# Patient Record
Sex: FEMALE | Race: WHITE | NOT HISPANIC OR LATINO | Employment: FULL TIME | URBAN - NONMETROPOLITAN AREA
[De-identification: names, ages, dates, MRNs, and addresses within clinical notes are randomized per-mention and may not be internally consistent; named-entity substitution may affect disease eponyms.]

---

## 2017-03-14 ENCOUNTER — OFFICE VISIT (OUTPATIENT)
Dept: OBSTETRICS AND GYNECOLOGY | Facility: CLINIC | Age: 57
End: 2017-03-14

## 2017-03-14 VITALS
BODY MASS INDEX: 31.66 KG/M2 | WEIGHT: 197 LBS | SYSTOLIC BLOOD PRESSURE: 119 MMHG | DIASTOLIC BLOOD PRESSURE: 73 MMHG | HEIGHT: 66 IN

## 2017-03-14 DIAGNOSIS — I10 ESSENTIAL HYPERTENSION: Primary | ICD-10-CM

## 2017-03-14 DIAGNOSIS — G47.01 INSOMNIA DUE TO MEDICAL CONDITION: ICD-10-CM

## 2017-03-14 DIAGNOSIS — N95.1 MENOPAUSAL SYMPTOMS: ICD-10-CM

## 2017-03-14 DIAGNOSIS — R53.83 LETHARGY: ICD-10-CM

## 2017-03-14 PROCEDURE — 99202 OFFICE O/P NEW SF 15 MIN: CPT | Performed by: OBSTETRICS & GYNECOLOGY

## 2017-03-14 RX ORDER — LISINOPRIL AND HYDROCHLOROTHIAZIDE 25; 20 MG/1; MG/1
1 TABLET ORAL DAILY
COMMUNITY
End: 2017-03-14

## 2017-03-14 RX ORDER — ASPIRIN 81 MG/1
81 TABLET ORAL DAILY
COMMUNITY

## 2017-03-14 RX ORDER — FEXOFENADINE HCL 180 MG/1
180 TABLET ORAL DAILY
Qty: 90 TABLET | Refills: 3 | Status: SHIPPED | OUTPATIENT
Start: 2017-03-14

## 2017-03-14 RX ORDER — LISINOPRIL 40 MG/1
40 TABLET ORAL DAILY
Qty: 90 TABLET | Refills: 3 | Status: SHIPPED | OUTPATIENT
Start: 2017-03-14 | End: 2018-05-07 | Stop reason: SDUPTHER

## 2017-03-14 RX ORDER — LISINOPRIL 40 MG/1
40 TABLET ORAL DAILY
Qty: 90 TABLET | Refills: 3 | Status: SHIPPED | OUTPATIENT
Start: 2017-03-14 | End: 2017-03-14 | Stop reason: SDUPTHER

## 2017-03-14 RX ORDER — FEXOFENADINE HCL 180 MG/1
180 TABLET ORAL DAILY
COMMUNITY
End: 2017-03-14 | Stop reason: SDUPTHER

## 2017-03-14 RX ORDER — LISINOPRIL 10 MG/1
10 TABLET ORAL DAILY
COMMUNITY
End: 2017-03-14

## 2017-03-14 RX ORDER — PSEUDOEPHEDRINE HCL 30 MG
30 TABLET ORAL EVERY 4 HOURS PRN
COMMUNITY

## 2017-03-14 RX ORDER — MELOXICAM 15 MG/1
15 TABLET ORAL DAILY
COMMUNITY
End: 2017-07-27 | Stop reason: SDUPTHER

## 2017-03-14 RX ORDER — HYDROCHLOROTHIAZIDE 25 MG/1
25 TABLET ORAL DAILY
Qty: 90 TABLET | Refills: 3 | Status: SHIPPED | OUTPATIENT
Start: 2017-03-14 | End: 2017-08-28 | Stop reason: SDUPTHER

## 2017-03-14 RX ORDER — ESZOPICLONE 3 MG/1
3 TABLET, FILM COATED ORAL NIGHTLY
Qty: 30 TABLET | Refills: 5 | Status: SHIPPED | OUTPATIENT
Start: 2017-03-14 | End: 2017-08-28 | Stop reason: SDUPTHER

## 2017-03-14 NOTE — PROGRESS NOTES
Subjective   Saniya Lundberg is a 56 y.o. female with lethargy and menopausal symptoms and hypertension.    History of Present Illness  She has had some hypotension on her current BP med regimen.  Also insomnia.  Postmenapausal.  Needs labs    The following portions of the patient's history were reviewed and updated as appropriate: allergies, current medications, past family history, past medical history, past social history, past surgical history and problem list.    Review of Systems   Constitutional: Positive for fatigue. Negative for activity change, appetite change, chills, diaphoresis, fever and unexpected weight change.   HENT: Negative for congestion, dental problem, drooling, ear discharge, ear pain, facial swelling, hearing loss, mouth sores, nosebleeds, postnasal drip, rhinorrhea, sinus pressure, sneezing, sore throat, tinnitus, trouble swallowing and voice change.    Eyes: Negative for photophobia, pain, discharge, redness, itching and visual disturbance.   Respiratory: Negative for apnea, cough, choking, chest tightness, shortness of breath, wheezing and stridor.    Cardiovascular: Negative for chest pain, palpitations and leg swelling.   Gastrointestinal: Negative for abdominal distention, abdominal pain, anal bleeding, blood in stool, constipation, diarrhea, nausea, rectal pain and vomiting.   Endocrine: Negative for cold intolerance, heat intolerance, polydipsia, polyphagia and polyuria.   Genitourinary: Negative for decreased urine volume, difficulty urinating, dysuria, enuresis, flank pain, frequency, genital sores, hematuria and urgency.   Musculoskeletal: Negative for arthralgias, back pain, gait problem, joint swelling, myalgias, neck pain and neck stiffness.   Skin: Negative for color change, pallor, rash and wound.   Allergic/Immunologic: Negative for environmental allergies, food allergies and immunocompromised state.   Neurological: Negative for dizziness, tremors, seizures, syncope,  facial asymmetry, speech difficulty, weakness, light-headedness, numbness and headaches.   Hematological: Negative for adenopathy. Does not bruise/bleed easily.   Psychiatric/Behavioral: Positive for sleep disturbance. Negative for agitation, behavioral problems, confusion, decreased concentration, dysphoric mood, hallucinations, self-injury and suicidal ideas. The patient is not nervous/anxious and is not hyperactive.        Objective   Physical Exam   Constitutional: She is oriented to person, place, and time. She appears well-developed and well-nourished. No distress.   HENT:   Head: Normocephalic and atraumatic.   Eyes: Conjunctivae and EOM are normal. Pupils are equal, round, and reactive to light.   Neck: Normal range of motion. Neck supple. No JVD present. No tracheal deviation present. No thyromegaly present.   Cardiovascular: Normal rate, regular rhythm, normal heart sounds and intact distal pulses.  Exam reveals no gallop and no friction rub.    No murmur heard.  Pulmonary/Chest: Effort normal and breath sounds normal. No stridor. No respiratory distress. She has no wheezes. She has no rales. She exhibits no tenderness.   Abdominal: Soft. Bowel sounds are normal. She exhibits no distension and no mass. There is no tenderness. There is no rebound and no guarding. No hernia.   Musculoskeletal: Normal range of motion. She exhibits no edema, tenderness or deformity.   Lymphadenopathy:     She has no cervical adenopathy.   Neurological: She is alert and oriented to person, place, and time. She has normal reflexes. She displays normal reflexes. No cranial nerve deficit. She exhibits normal muscle tone. Coordination normal.   Skin: Skin is warm and dry. No rash noted. She is not diaphoretic. No erythema. No pallor.   Psychiatric: She has a normal mood and affect. Her behavior is normal. Judgment and thought content normal.   Nursing note and vitals reviewed.      Assessment/Plan   Saniya was seen today for [facility]  refill.    Diagnoses and all orders for this visit:    Essential hypertension  -     CBC & Differential; Future  -     Cancel: Comprehensive Metabolic Panel  -     TSH; Future  -     Cancel: Lipid Panel  -     Comprehensive Metabolic Panel; Future  -     Lipid Panel; Future    Menopausal symptoms    Insomnia due to medical condition    Lethargy  -     Cancel: Vitamin D 25 Hydroxy  -     Insulin, Total; Future  -     Hemoglobin A1c; Future  -     Vitamin D 25 Hydroxy; Future  -     Folate; Future  -     Vitamin B12; Future    Other orders  -     Discontinue: lisinopril (PRINIVIL,ZESTRIL) 40 MG tablet; Take 1 tablet by mouth Daily.  -     fexofenadine (ALLEGRA) 180 MG tablet; Take 1 tablet by mouth Daily.  -     Mirabegron ER (MYRBETRIQ) 25 MG tablet sustained-release 24 hour 24 hr tablet; Take 1 tablet by mouth Daily.  -     lisinopril (PRINIVIL,ZESTRIL) 40 MG tablet; Take 1 tablet by mouth Daily.  -     hydrochlorothiazide (HYDRODIURIL) 25 MG tablet; Take 1 tablet by mouth Daily.  -     eszopiclone (LUNESTA) 3 MG tablet; Take 1 tablet by mouth Every Night. Take immediately before bedtime

## 2017-03-24 ENCOUNTER — LAB (OUTPATIENT)
Dept: LAB | Facility: CLINIC | Age: 57
End: 2017-03-24

## 2017-03-24 DIAGNOSIS — I10 ESSENTIAL HYPERTENSION: ICD-10-CM

## 2017-03-24 DIAGNOSIS — R53.83 LETHARGY: ICD-10-CM

## 2017-03-24 LAB
25(OH)D3 SERPL-MCNC: 28.5 NG/ML (ref 30–100)
ALBUMIN SERPL-MCNC: 4.1 G/DL (ref 3.4–4.8)
ALBUMIN/GLOB SERPL: 1.6 G/DL (ref 1.1–1.8)
ALP SERPL-CCNC: 77 U/L (ref 38–126)
ALT SERPL W P-5'-P-CCNC: 27 U/L (ref 9–52)
ANION GAP SERPL CALCULATED.3IONS-SCNC: 10 MMOL/L (ref 5–15)
ARTICHOKE IGE QN: 121 MG/DL (ref 1–129)
AST SERPL-CCNC: 19 U/L (ref 14–36)
BASOPHILS # BLD AUTO: 0.02 10*3/MM3 (ref 0–0.2)
BASOPHILS NFR BLD AUTO: 0.2 % (ref 0–2)
BILIRUB SERPL-MCNC: 0.5 MG/DL (ref 0.2–1.3)
BUN BLD-MCNC: 31 MG/DL (ref 7–21)
BUN/CREAT SERPL: 39.2 (ref 7–25)
CALCIUM SPEC-SCNC: 9.6 MG/DL (ref 8.4–10.2)
CHLORIDE SERPL-SCNC: 102 MMOL/L (ref 95–110)
CHOLEST SERPL-MCNC: 233 MG/DL (ref 0–199)
CO2 SERPL-SCNC: 27 MMOL/L (ref 22–31)
CREAT BLD-MCNC: 0.79 MG/DL (ref 0.5–1)
DEPRECATED RDW RBC AUTO: 49.9 FL (ref 36.4–46.3)
EOSINOPHIL # BLD AUTO: 0.1 10*3/MM3 (ref 0–0.7)
EOSINOPHIL NFR BLD AUTO: 1.1 % (ref 0–7)
ERYTHROCYTE [DISTWIDTH] IN BLOOD BY AUTOMATED COUNT: 15 % (ref 11.5–14.5)
FOLATE SERPL-MCNC: 8.24 NG/ML (ref 2.76–21)
GFR SERPL CREATININE-BSD FRML MDRD: 76 ML/MIN/1.73 (ref 51–120)
GLOBULIN UR ELPH-MCNC: 2.5 GM/DL (ref 2.3–3.5)
GLUCOSE BLD-MCNC: 91 MG/DL (ref 60–100)
HBA1C MFR BLD: 5.68 % (ref 4–5.6)
HCT VFR BLD AUTO: 44.9 % (ref 35–45)
HDLC SERPL-MCNC: 81 MG/DL (ref 60–200)
HGB BLD-MCNC: 14.7 G/DL (ref 12–15.5)
IMM GRANULOCYTES # BLD: 0.02 10*3/MM3 (ref 0–0.02)
IMM GRANULOCYTES NFR BLD: 0.2 % (ref 0–0.5)
LDLC/HDLC SERPL: 1.59 {RATIO} (ref 0–3.22)
LYMPHOCYTES # BLD AUTO: 2.12 10*3/MM3 (ref 0.6–4.2)
LYMPHOCYTES NFR BLD AUTO: 22.3 % (ref 10–50)
MCH RBC QN AUTO: 29.6 PG (ref 26.5–34)
MCHC RBC AUTO-ENTMCNC: 32.7 G/DL (ref 31.4–36)
MCV RBC AUTO: 90.5 FL (ref 80–98)
MONOCYTES # BLD AUTO: 0.54 10*3/MM3 (ref 0–0.9)
MONOCYTES NFR BLD AUTO: 5.7 % (ref 0–12)
NEUTROPHILS # BLD AUTO: 6.72 10*3/MM3 (ref 2–8.6)
NEUTROPHILS NFR BLD AUTO: 70.5 % (ref 37–80)
PLATELET # BLD AUTO: 360 10*3/MM3 (ref 150–450)
PMV BLD AUTO: 10.2 FL (ref 8–12)
POTASSIUM BLD-SCNC: 4.5 MMOL/L (ref 3.5–5.1)
PROT SERPL-MCNC: 6.6 G/DL (ref 6.3–8.6)
RBC # BLD AUTO: 4.96 10*6/MM3 (ref 3.77–5.16)
SODIUM BLD-SCNC: 139 MMOL/L (ref 137–145)
TRIGL SERPL-MCNC: 115 MG/DL (ref 20–199)
TSH SERPL DL<=0.05 MIU/L-ACNC: 0.67 MIU/ML (ref 0.46–4.68)
VIT B12 BLD-MCNC: 293 PG/ML (ref 239–931)
WBC NRBC COR # BLD: 9.52 10*3/MM3 (ref 3.2–9.8)

## 2017-03-24 PROCEDURE — 85025 COMPLETE CBC W/AUTO DIFF WBC: CPT | Performed by: OBSTETRICS & GYNECOLOGY

## 2017-03-24 PROCEDURE — 83525 ASSAY OF INSULIN: CPT | Performed by: OBSTETRICS & GYNECOLOGY

## 2017-03-24 PROCEDURE — 80053 COMPREHEN METABOLIC PANEL: CPT | Performed by: OBSTETRICS & GYNECOLOGY

## 2017-03-24 PROCEDURE — 82746 ASSAY OF FOLIC ACID SERUM: CPT | Performed by: OBSTETRICS & GYNECOLOGY

## 2017-03-24 PROCEDURE — 83036 HEMOGLOBIN GLYCOSYLATED A1C: CPT | Performed by: OBSTETRICS & GYNECOLOGY

## 2017-03-24 PROCEDURE — 80061 LIPID PANEL: CPT | Performed by: OBSTETRICS & GYNECOLOGY

## 2017-03-24 PROCEDURE — 82607 VITAMIN B-12: CPT | Performed by: OBSTETRICS & GYNECOLOGY

## 2017-03-24 PROCEDURE — 82306 VITAMIN D 25 HYDROXY: CPT | Performed by: OBSTETRICS & GYNECOLOGY

## 2017-03-24 PROCEDURE — 84443 ASSAY THYROID STIM HORMONE: CPT | Performed by: OBSTETRICS & GYNECOLOGY

## 2017-03-25 LAB — INSULIN SERPL-ACNC: 26.3 UIU/ML (ref 2.6–24.9)

## 2017-04-03 ENCOUNTER — TELEPHONE (OUTPATIENT)
Dept: OBSTETRICS AND GYNECOLOGY | Facility: CLINIC | Age: 57
End: 2017-04-03

## 2017-04-03 NOTE — TELEPHONE ENCOUNTER
----- Message from Mari Bravo sent at 4/3/2017 10:00 AM CDT -----  Contact: [phone]  Patient is returning your phone call     I called this patient with her results and Dr. Bell said she should be on vit d3.  She has this and will start it daily.  Her insulin is slightly high and should be on metformin.  I sent in a RX for Metformin 500mg Take 2 tablets bid #120 x 11.

## 2017-04-25 ENCOUNTER — OFFICE VISIT (OUTPATIENT)
Dept: OBSTETRICS AND GYNECOLOGY | Facility: CLINIC | Age: 57
End: 2017-04-25

## 2017-04-25 VITALS
BODY MASS INDEX: 30.53 KG/M2 | WEIGHT: 190 LBS | SYSTOLIC BLOOD PRESSURE: 118 MMHG | DIASTOLIC BLOOD PRESSURE: 71 MMHG | HEIGHT: 66 IN

## 2017-04-25 DIAGNOSIS — I10 ESSENTIAL HYPERTENSION: Chronic | ICD-10-CM

## 2017-04-25 DIAGNOSIS — E66.9 OBESITY (BMI 30.0-34.9): Chronic | ICD-10-CM

## 2017-04-25 DIAGNOSIS — E88.81 INSULIN RESISTANCE: Chronic | ICD-10-CM

## 2017-04-25 DIAGNOSIS — N95.1 MENOPAUSAL AND FEMALE CLIMACTERIC STATES: Primary | Chronic | ICD-10-CM

## 2017-04-25 PROCEDURE — 99214 OFFICE O/P EST MOD 30 MIN: CPT | Performed by: OBSTETRICS & GYNECOLOGY

## 2017-04-25 NOTE — PROGRESS NOTES
Saniya Lundberg is a 56 y.o. y/o female     Chief Complaint: Discuss lab results and insulin resistance.    HPI: 56-year-old.  See note by me March 14, 2017.  Her B12 level was the low end of normal.  Vitamin D level was low.  Fasting insulin level was elevated to 26.3.  We called and discussed this with her, and initiated metformin therapy about one month ago.  She has taken metformin 500 mg twice a day.  At first the metformin cause nausea, but it is not currently causing her any problems.  Her blood pressures perfect.  She has started taking vitamin D3 5000 international units per day.  She admits to very little exercise.  She had been on a low-carb diet and lost 37 pounds, but she has gained about half of it back.  I reviewed all of her lab work with her.    Review of Systems   Constitutional: Positive for fatigue. Negative for activity change, appetite change, chills, diaphoresis, fever and unexpected weight change.   Gastrointestinal: Positive for nausea. Negative for abdominal pain, constipation and diarrhea.   Genitourinary: Negative for difficulty urinating, dyspareunia, dysuria, pelvic pain, urgency, vaginal bleeding, vaginal discharge and vaginal pain.   Neurological: Negative for headaches.   Psychiatric/Behavioral: Negative for dysphoric mood. The patient is not nervous/anxious.         The following portions of the patient's history were reviewed and updated as appropriate: allergies, current medications, past family history, past medical history, past social history, past surgical history and problem list.    No Known Allergies       Current Outpatient Prescriptions:   •  Cholecalciferol (VITAMIN D3) 5000 UNITS tablet, Take 1 tablet by mouth Daily., Disp: , Rfl:   •  aspirin 81 MG EC tablet, Take 81 mg by mouth Daily., Disp: , Rfl:   •  eszopiclone (LUNESTA) 3 MG tablet, Take 1 tablet by mouth Every Night. Take immediately before bedtime, Disp: 30 tablet, Rfl: 5  •  fexofenadine (ALLEGRA) 180 MG  "tablet, Take 1 tablet by mouth Daily., Disp: 90 tablet, Rfl: 3  •  hydrochlorothiazide (HYDRODIURIL) 25 MG tablet, Take 1 tablet by mouth Daily., Disp: 90 tablet, Rfl: 3  •  lisinopril (PRINIVIL,ZESTRIL) 40 MG tablet, Take 1 tablet by mouth Daily., Disp: 90 tablet, Rfl: 3  •  meloxicam (MOBIC) 15 MG tablet, Take 15 mg by mouth Daily., Disp: , Rfl:   •  metFORMIN (GLUCOPHAGE) 500 MG tablet, Take 2 tablets po bid, Disp: 120 tablet, Rfl: 11  •  Mirabegron ER (MYRBETRIQ) 25 MG tablet sustained-release 24 hour 24 hr tablet, Take 1 tablet by mouth Daily., Disp: 90 tablet, Rfl: 3  •  pseudoephedrine (SUDAFED) 30 MG tablet, Take 30 mg by mouth Every 4 (Four) Hours As Needed for congestion., Disp: , Rfl:      The patient has a family history of   No family history on file.     Past Medical History:   Diagnosis Date   • Essential hypertension 4/25/2017   • Insulin resistance 4/25/2017   • Menopausal and female climacteric states 4/25/2017   • Obesity (BMI 30.0-34.9) 4/25/2017        OB History     No data available           Social History     Social History   • Marital status: Single     Spouse name: N/A   • Number of children: N/A   • Years of education: N/A     Occupational History   • Not on file.     Social History Main Topics   • Smoking status: Never Smoker   • Smokeless tobacco: Not on file   • Alcohol use Not on file   • Drug use: Not on file   • Sexual activity: Not on file     Other Topics Concern   • Not on file     Social History Narrative        No past surgical history on file.     Patient Active Problem List   Diagnosis   • Menopausal and female climacteric states   • Essential hypertension   • Insulin resistance   • Obesity (BMI 30.0-34.9)        Documented Vitals    04/25/17 0914   BP: 118/71   Weight: 190 lb (86.2 kg)   Height: 66\" (167.6 cm)   PainSc: 0-No pain       Physical Exam   Constitutional: She is oriented to person, place, and time. No distress.   Overweight white female weighing 190 pounds with " BMI 30.7.   HENT:   Head: Normocephalic and atraumatic.   Eyes: Conjunctivae and EOM are normal. Pupils are equal, round, and reactive to light.   Neck: Normal range of motion. Neck supple. No JVD present. No tracheal deviation present. No thyromegaly present.   Cardiovascular: Normal rate, regular rhythm, normal heart sounds and intact distal pulses.  Exam reveals no gallop and no friction rub.    No murmur heard.  Pulmonary/Chest: Effort normal and breath sounds normal. No stridor. No respiratory distress. She has no wheezes. She has no rales. She exhibits no tenderness.   Abdominal: Soft. Bowel sounds are normal. She exhibits no distension and no mass. There is no tenderness. There is no rebound and no guarding. No hernia.   Musculoskeletal: Normal range of motion. She exhibits no edema, tenderness or deformity.   Lymphadenopathy:     She has no cervical adenopathy.   Neurological: She is alert and oriented to person, place, and time. She has normal reflexes. She displays normal reflexes. No cranial nerve deficit. She exhibits normal muscle tone. Coordination normal.   Skin: Skin is warm and dry. No rash noted. She is not diaphoretic. No erythema. No pallor.   Psychiatric: She has a normal mood and affect. Her behavior is normal. Judgment and thought content normal.   Nursing note and vitals reviewed.       Assessment        Diagnosis Plan   1. Menopausal and female climacteric states     2. Insulin resistance     3. Essential hypertension     4. Obesity (BMI 30.0-34.9)           Plan    1.  Encouraged in diet and exercise.  2.  Recommend B12 liquid drops and vitamin D3 5000 international units per day.  3.  Continue metformin 500 mg twice a day.  4.  Continue other medication.  5.  Follow-up in 2 months.  Follow-up sooner as needed.                  This document has been electronically signed by Abdifatah Bell MD on April 25, 2017 9:47 AM

## 2017-06-26 ENCOUNTER — OFFICE VISIT (OUTPATIENT)
Dept: OBSTETRICS AND GYNECOLOGY | Facility: CLINIC | Age: 57
End: 2017-06-26

## 2017-06-26 VITALS
BODY MASS INDEX: 28.93 KG/M2 | SYSTOLIC BLOOD PRESSURE: 122 MMHG | WEIGHT: 180 LBS | HEIGHT: 66 IN | DIASTOLIC BLOOD PRESSURE: 75 MMHG

## 2017-06-26 DIAGNOSIS — N95.1 MENOPAUSAL AND FEMALE CLIMACTERIC STATES: Primary | Chronic | ICD-10-CM

## 2017-06-26 DIAGNOSIS — E66.3 OVERWEIGHT (BMI 25.0-29.9): Chronic | ICD-10-CM

## 2017-06-26 DIAGNOSIS — I10 ESSENTIAL HYPERTENSION: Chronic | ICD-10-CM

## 2017-06-26 DIAGNOSIS — E88.81 INSULIN RESISTANCE: Chronic | ICD-10-CM

## 2017-06-26 PROBLEM — E66.9 OBESITY (BMI 30.0-34.9): Chronic | Status: RESOLVED | Noted: 2017-04-25 | Resolved: 2017-06-26

## 2017-06-26 PROCEDURE — 99214 OFFICE O/P EST MOD 30 MIN: CPT | Performed by: OBSTETRICS & GYNECOLOGY

## 2017-06-26 RX ORDER — ESTRADIOL 0.1 MG/D
1 FILM, EXTENDED RELEASE TRANSDERMAL 2 TIMES WEEKLY
Qty: 8 PATCH | Refills: 12 | Status: SHIPPED | OUTPATIENT
Start: 2017-06-26 | End: 2018-08-05 | Stop reason: SDUPTHER

## 2017-06-26 NOTE — PROGRESS NOTES
Saniya Lundberg is a 56 y.o. y/o female     Chief Complaint: Hot flashes and bloating    HPI: See note by me on April 25, 2017.  56-year-old. See note by me March 14, 2017.  She is taking metformin 500 mg three times a day. At first the metformin caused nausea, but it is not currently causing her any problems. Her blood pressure's perfect. She has started taking vitamin D3 5000 international units per day. She admits to very little exercise. She had been on a low-carb diet and lost 10 more pounds since her last visit.  We further discussed insulin resistance.  We also discussed estradiol levels fluctuate based on body fat.    Review of Systems   Constitutional: Positive for fatigue. Negative for activity change, appetite change, chills, diaphoresis, fever and unexpected weight change.   Gastrointestinal: Negative for abdominal pain, constipation, diarrhea and nausea.   Genitourinary: Negative for difficulty urinating, dysuria, pelvic pain, urgency, vaginal bleeding, vaginal discharge and vaginal pain.   Neurological: Negative for headaches.   Psychiatric/Behavioral: Negative for dysphoric mood. The patient is not nervous/anxious.       Breast ROS: negative    The following portions of the patient's history were reviewed and updated as appropriate: allergies, current medications, past family history, past medical history, past social history, past surgical history and problem list.    No Known Allergies       Current Outpatient Prescriptions:   •  aspirin 81 MG EC tablet, Take 81 mg by mouth Daily., Disp: , Rfl:   •  Cholecalciferol (VITAMIN D3) 5000 UNITS tablet, Take 1 tablet by mouth Daily., Disp: , Rfl:   •  eszopiclone (LUNESTA) 3 MG tablet, Take 1 tablet by mouth Every Night. Take immediately before bedtime, Disp: 30 tablet, Rfl: 5  •  fexofenadine (ALLEGRA) 180 MG tablet, Take 1 tablet by mouth Daily., Disp: 90 tablet, Rfl: 3  •  hydrochlorothiazide (HYDRODIURIL) 25 MG tablet, Take 1 tablet by mouth  "Daily., Disp: 90 tablet, Rfl: 3  •  lisinopril (PRINIVIL,ZESTRIL) 40 MG tablet, Take 1 tablet by mouth Daily., Disp: 90 tablet, Rfl: 3  •  meloxicam (MOBIC) 15 MG tablet, Take 15 mg by mouth Daily., Disp: , Rfl:   •  metFORMIN (GLUCOPHAGE) 500 MG tablet, Take 2 tablets po bid, Disp: 120 tablet, Rfl: 11  •  Mirabegron ER (MYRBETRIQ) 25 MG tablet sustained-release 24 hour 24 hr tablet, Take 1 tablet by mouth Daily., Disp: 90 tablet, Rfl: 3  •  pseudoephedrine (SUDAFED) 30 MG tablet, Take 30 mg by mouth Every 4 (Four) Hours As Needed for congestion., Disp: , Rfl:   •  estradiol (MINIVELLE, VIVELLE-DOT) 0.1 MG/24HR patch, Place 1 patch on the skin 2 (Two) Times a Week., Disp: 8 patch, Rfl: 12     The patient has a family history of   No family history on file.     Past Medical History:   Diagnosis Date   • Essential hypertension 4/25/2017   • Insulin resistance 4/25/2017   • Menopausal and female climacteric states 4/25/2017   • Obesity (BMI 30.0-34.9) 4/25/2017   • Overweight (BMI 25.0-29.9) 6/26/2017        OB History     No data available           Social History     Social History   • Marital status: Single     Spouse name: N/A   • Number of children: N/A   • Years of education: N/A     Occupational History   • Not on file.     Social History Main Topics   • Smoking status: Never Smoker   • Smokeless tobacco: Not on file   • Alcohol use Not on file   • Drug use: Not on file   • Sexual activity: Not on file     Other Topics Concern   • Not on file     Social History Narrative        No past surgical history on file.     Patient Active Problem List   Diagnosis   • Menopausal and female climacteric states   • Essential hypertension   • Insulin resistance   • Overweight (BMI 25.0-29.9)        Documented Vitals    06/26/17 0855   BP: 122/75   Weight: 180 lb (81.6 kg)   Height: 66\" (167.6 cm)   PainSc: 0-No pain       Physical Exam   Constitutional: She is oriented to person, place, and time. No distress.   Overweight " white female weighing in the 180 pounds with BMI 29.1.   HENT:   Head: Normocephalic and atraumatic.   Eyes: Conjunctivae and EOM are normal. Pupils are equal, round, and reactive to light.   Neck: Normal range of motion. Neck supple. No JVD present. No tracheal deviation present. No thyromegaly present.   Cardiovascular: Normal rate, regular rhythm, normal heart sounds and intact distal pulses.  Exam reveals no gallop and no friction rub.    No murmur heard.  Pulmonary/Chest: Effort normal and breath sounds normal. No stridor. No respiratory distress. She has no wheezes. She has no rales. She exhibits no tenderness.   Abdominal: Soft. Bowel sounds are normal. She exhibits no distension and no mass. There is no tenderness. There is no rebound and no guarding. No hernia.   Musculoskeletal: Normal range of motion. She exhibits no edema, tenderness or deformity.   Lymphadenopathy:     She has no cervical adenopathy.   Neurological: She is alert and oriented to person, place, and time. She has normal reflexes. She displays normal reflexes. No cranial nerve deficit. She exhibits normal muscle tone. Coordination normal.   Skin: Skin is warm and dry. No rash noted. She is not diaphoretic. No erythema. No pallor.   Psychiatric: She has a normal mood and affect. Her behavior is normal. Judgment and thought content normal.   Nursing note and vitals reviewed.       Assessment        Diagnosis Plan   1. Menopausal and female climacteric states     2. Insulin resistance     3. Essential hypertension     4. Overweight (BMI 25.0-29.9)           Plan    1.  Go back to the many Vivelle patch 0.1 mg twice weekly.  She can see how she does on half a patch.  2.  Encouraged in diet and exercise.  3.  Continue metformin.  4.  Follow-up in 2 months.  Follow-up sooner as needed.                  This document has been electronically signed by Abdifatah Bell MD on June 26, 2017 9:20 AM

## 2017-07-27 ENCOUNTER — TELEPHONE (OUTPATIENT)
Dept: OBSTETRICS AND GYNECOLOGY | Facility: CLINIC | Age: 57
End: 2017-07-27

## 2017-07-27 RX ORDER — MELOXICAM 15 MG/1
15 TABLET ORAL DAILY
Qty: 30 TABLET | Refills: 11 | Status: SHIPPED | OUTPATIENT
Start: 2017-07-27 | End: 2017-11-01

## 2017-07-27 NOTE — TELEPHONE ENCOUNTER
----- Message from Susie Limon sent at 7/27/2017  9:06 AM CDT -----  Regarding: refill  Contact: [phone]  Mobic/rehana...called into [facility]

## 2017-08-28 ENCOUNTER — OFFICE VISIT (OUTPATIENT)
Dept: OBSTETRICS AND GYNECOLOGY | Facility: CLINIC | Age: 57
End: 2017-08-28

## 2017-08-28 VITALS
SYSTOLIC BLOOD PRESSURE: 122 MMHG | BODY MASS INDEX: 28.77 KG/M2 | WEIGHT: 179 LBS | HEIGHT: 66 IN | DIASTOLIC BLOOD PRESSURE: 80 MMHG

## 2017-08-28 DIAGNOSIS — N95.1 MENOPAUSAL AND FEMALE CLIMACTERIC STATES: Chronic | ICD-10-CM

## 2017-08-28 DIAGNOSIS — G47.01 INSOMNIA DUE TO MEDICAL CONDITION: Chronic | ICD-10-CM

## 2017-08-28 DIAGNOSIS — N32.81 OVERACTIVE BLADDER: Primary | Chronic | ICD-10-CM

## 2017-08-28 PROCEDURE — 99214 OFFICE O/P EST MOD 30 MIN: CPT | Performed by: OBSTETRICS & GYNECOLOGY

## 2017-08-28 RX ORDER — ESZOPICLONE 3 MG/1
3 TABLET, FILM COATED ORAL NIGHTLY
Qty: 30 TABLET | Refills: 5 | Status: SHIPPED | OUTPATIENT
Start: 2017-08-28 | End: 2017-10-16 | Stop reason: SDUPTHER

## 2017-08-28 RX ORDER — HYDROCHLOROTHIAZIDE 25 MG/1
25 TABLET ORAL DAILY
Qty: 90 TABLET | Refills: 3 | Status: SHIPPED | OUTPATIENT
Start: 2017-08-28 | End: 2018-11-18 | Stop reason: SDUPTHER

## 2017-08-28 NOTE — PROGRESS NOTES
Saniya Lundberg is a 57 y.o. y/o female     Chief Complaint: Needs refills on Lunesta, hydrochlorothiazide, and Myrbetriq.  Having hot flashes in spite of estradiol patch.    HPI: 56-year-old  with two daughters.  She has had a hysterectomy and BSO.  She is taking metformin 500 mg three times a day. At first the metformin caused nausea, but it is not currently causing her any problems. Her blood pressure's perfect.  She was having problems with her blood pressure getting too low, and she stopped taking her lisinopril.  She then noticed that her blood pressure was a little elevated, and she went back to the lisinopril 20 mg per day.  She is also on hydrochlorothiazide 25 mg per day.  She takes vitamin D3 5000 international units per day. She admits to very little exercise. She had been on a low-carb diet and lost another pound since her last visit.  We further discussed insulin resistance.  We also discussed how estradiol levels fluctuate based on body fat.    She is having significant hot flashes, particularly in her head.  We discussed the estradiol shot, and she is interested in trying this.    She needs refills of her Lunesta, hydrochlorothiazide, and Myrbetriq.    Review of Systems   Constitutional: Positive for fatigue ( But improving). Negative for activity change, appetite change, chills, diaphoresis, fever and unexpected weight change.   Gastrointestinal: Negative for abdominal pain, constipation, diarrhea and nausea.   Genitourinary: Negative for difficulty urinating, dysuria, pelvic pain, urgency, vaginal bleeding, vaginal discharge and vaginal pain.   Neurological: Negative for headaches.   Psychiatric/Behavioral: Negative for dysphoric mood. The patient is not nervous/anxious.    All other systems reviewed and are negative.     Breast ROS: negative    The following portions of the patient's history were reviewed and updated as appropriate: allergies, current medications, past family history,  past medical history, past social history, past surgical history and problem list.    No Known Allergies       Current Outpatient Prescriptions:   •  aspirin 81 MG EC tablet, Take 81 mg by mouth Daily., Disp: , Rfl:   •  Cholecalciferol (VITAMIN D3) 5000 UNITS tablet, Take 1 tablet by mouth Daily., Disp: , Rfl:   •  Cyanocobalamin (B12 LIQUID HEALTH BOOSTER PO), Take  by mouth., Disp: , Rfl:   •  estradiol (MINIVELLE, VIVELLE-DOT) 0.1 MG/24HR patch, Place 1 patch on the skin 2 (Two) Times a Week., Disp: 8 patch, Rfl: 12  •  eszopiclone (LUNESTA) 3 MG tablet, Take 1 tablet by mouth Every Night. Take immediately before bedtime, Disp: 30 tablet, Rfl: 5  •  fexofenadine (ALLEGRA) 180 MG tablet, Take 1 tablet by mouth Daily., Disp: 90 tablet, Rfl: 3  •  hydrochlorothiazide (HYDRODIURIL) 25 MG tablet, Take 1 tablet by mouth Daily., Disp: 90 tablet, Rfl: 3  •  lisinopril (PRINIVIL,ZESTRIL) 40 MG tablet, Take 1 tablet by mouth Daily., Disp: 90 tablet, Rfl: 3  •  meloxicam (MOBIC) 15 MG tablet, Take 1 tablet by mouth Daily., Disp: 30 tablet, Rfl: 11  •  metFORMIN (GLUCOPHAGE) 500 MG tablet, Take 2 tablets po bid (Patient taking differently: Take 3 Tabs daily), Disp: 120 tablet, Rfl: 11  •  Mirabegron ER (MYRBETRIQ) 25 MG tablet sustained-release 24 hour 24 hr tablet, Take 1 tablet by mouth Daily., Disp: 90 tablet, Rfl: 3  •  pseudoephedrine (SUDAFED) 30 MG tablet, Take 30 mg by mouth Every 4 (Four) Hours As Needed for congestion., Disp: , Rfl:      The patient has a family history of   No family history on file.     Past Medical History:   Diagnosis Date   • Essential hypertension 4/25/2017   • Insomnia due to medical condition 8/28/2017   • Insulin resistance 4/25/2017   • Menopausal and female climacteric states 4/25/2017   • Obesity (BMI 30.0-34.9) 4/25/2017   • Overactive bladder 8/28/2017   • Overweight (BMI 25.0-29.9) 6/26/2017        OB History     No data available           Social History     Social History   •  "Marital status: Single     Spouse name: N/A   • Number of children: N/A   • Years of education: N/A     Occupational History   • Not on file.     Social History Main Topics   • Smoking status: Never Smoker   • Smokeless tobacco: Never Used   • Alcohol use No   • Drug use: No   • Sexual activity: Not on file     Other Topics Concern   • Not on file     Social History Narrative        No past surgical history on file.     Patient Active Problem List   Diagnosis   • Menopausal and female climacteric states   • Essential hypertension   • Insulin resistance   • Overweight (BMI 25.0-29.9)   • Overactive bladder   • Insomnia due to medical condition        Documented Vitals    08/28/17 0944   BP: 122/80   Weight: 179 lb (81.2 kg)   Height: 66\" (167.6 cm)   PainSc: 0-No pain       Physical Exam   Constitutional: She is oriented to person, place, and time. No distress.   Overweight white female weighing 279 pounds with BMI 28.9.   HENT:   Head: Normocephalic and atraumatic.   Eyes: Conjunctivae and EOM are normal. Pupils are equal, round, and reactive to light.   Neck: Normal range of motion. Neck supple. No JVD present. No tracheal deviation present. No thyromegaly present.   Cardiovascular: Normal rate, regular rhythm, normal heart sounds and intact distal pulses.  Exam reveals no gallop and no friction rub.    No murmur heard.  Pulmonary/Chest: Effort normal and breath sounds normal. No stridor. No respiratory distress. She has no wheezes. She has no rales. She exhibits no tenderness.   Abdominal: Soft. Bowel sounds are normal. She exhibits no distension and no mass. There is no tenderness. There is no rebound and no guarding. No hernia.   Musculoskeletal: Normal range of motion. She exhibits no edema, tenderness or deformity.   Lymphadenopathy:     She has no cervical adenopathy.   Neurological: She is alert and oriented to person, place, and time. She has normal reflexes. She displays normal reflexes. No cranial nerve " deficit. She exhibits normal muscle tone. Coordination normal.   Skin: Skin is warm and dry. No rash noted. She is not diaphoretic. No erythema. No pallor.   Psychiatric: She has a normal mood and affect. Her behavior is normal. Judgment and thought content normal.   Nursing note and vitals reviewed.       Assessment        Diagnosis Plan   1. Overactive bladder     2. Menopausal and female climacteric states     3. Insomnia due to medical condition           Plan      1. Estradiol shot every month.  2. Continue estradiol patch.  3. Continue all current medications.  4. Refilled Lunesta, hydrochlorothiazide, and Myrbetriq.  5. Encouraged in diet and exercise.  6. Handouts on depression, hot flashes, exercise, and vitamin use.   7. Follow-up in 2 months..  Follow-up sooner as needed.            This document has been electronically signed by Abdifatah Bell MD on August 28, 2017 10:33 AM

## 2017-08-29 ENCOUNTER — TELEPHONE (OUTPATIENT)
Dept: OBSTETRICS AND GYNECOLOGY | Facility: CLINIC | Age: 57
End: 2017-08-29

## 2017-08-29 NOTE — TELEPHONE ENCOUNTER
----- Message from Delisa Kang sent at 8/29/2017  1:05 PM CDT -----  Contact: [phone]  Patient went to  script from [facility] and [facility], patient says it is not there. Thanks!

## 2017-09-13 RX ORDER — GLYCOPYRROLATE 1 MG/1
1 TABLET ORAL 2 TIMES DAILY
Qty: 60 TABLET | Refills: 12 | Status: SHIPPED | OUTPATIENT
Start: 2017-09-13 | End: 2019-07-15

## 2017-10-16 ENCOUNTER — TELEPHONE (OUTPATIENT)
Dept: OBSTETRICS AND GYNECOLOGY | Facility: CLINIC | Age: 57
End: 2017-10-16

## 2017-10-16 RX ORDER — ESZOPICLONE 3 MG/1
3 TABLET, FILM COATED ORAL NIGHTLY
Qty: 30 TABLET | Refills: 5 | Status: SHIPPED | OUTPATIENT
Start: 2017-10-16 | End: 2017-11-01 | Stop reason: SDUPTHER

## 2017-10-16 NOTE — TELEPHONE ENCOUNTER
----- Message from Susie Limon sent at 10/13/2017 10:31 AM CDT -----  Regarding: refill/lunesta  Contact: [phone]  Called [facility]..

## 2017-11-01 ENCOUNTER — OFFICE VISIT (OUTPATIENT)
Dept: OBSTETRICS AND GYNECOLOGY | Facility: CLINIC | Age: 57
End: 2017-11-01

## 2017-11-01 VITALS
SYSTOLIC BLOOD PRESSURE: 143 MMHG | HEIGHT: 66 IN | DIASTOLIC BLOOD PRESSURE: 75 MMHG | BODY MASS INDEX: 29.57 KG/M2 | WEIGHT: 184 LBS

## 2017-11-01 DIAGNOSIS — G47.01 INSOMNIA DUE TO MEDICAL CONDITION: Chronic | ICD-10-CM

## 2017-11-01 DIAGNOSIS — N32.81 OVERACTIVE BLADDER: Primary | Chronic | ICD-10-CM

## 2017-11-01 DIAGNOSIS — I10 ESSENTIAL HYPERTENSION: Chronic | ICD-10-CM

## 2017-11-01 DIAGNOSIS — N95.1 MENOPAUSAL SYNDROME: Chronic | ICD-10-CM

## 2017-11-01 PROCEDURE — 99214 OFFICE O/P EST MOD 30 MIN: CPT | Performed by: OBSTETRICS & GYNECOLOGY

## 2017-11-01 RX ORDER — MELOXICAM 15 MG/1
15 TABLET ORAL DAILY
Qty: 90 TABLET | Refills: 4 | Status: SHIPPED | OUTPATIENT
Start: 2017-11-01 | End: 2019-02-17 | Stop reason: SDUPTHER

## 2017-11-01 RX ORDER — ESZOPICLONE 3 MG/1
3 TABLET, FILM COATED ORAL NIGHTLY
Qty: 30 TABLET | Refills: 5 | Status: SHIPPED | OUTPATIENT
Start: 2017-11-01 | End: 2018-08-06 | Stop reason: SDUPTHER

## 2017-11-02 NOTE — PROGRESS NOTES
Saniya Lundberg is a 57 y.o. y/o female     Chief Complaint: Overactive bladder, insomnia, improvement in menopausal symptoms    HPI: 56-year-old  with two daughters.  She has had a hysterectomy and BSO.      She is taking metformin 500 mg three times a day. At first the metformin caused nausea, but it is not currently causing her any problems.      She was having problems with her blood pressure getting too low, and she stopped taking her lisinopril.  She then noticed that her blood pressure was a little elevated, and she went back to the lisinopril 40 mg per day.  She is also on hydrochlorothiazide 25 mg per day.     She takes vitamin D3 5000 international units per day. She admits to very little exercise. She had been on a low-carb diet.      We further discussed insulin resistance.  We also discussed how estradiol levels fluctuate based on body fat.     She has now had two estradiol hormone shots, and her problems with hot flashes are improving.    Myrbetriq 25 mg is not helping but the 50 mg works well for her.     She needs refills of her Myrbetriq and Mobic sent to mail order pharmacy.    Review of Systems   Constitutional: Positive for fatigue. Negative for activity change, appetite change, chills, diaphoresis, fever and unexpected weight change.   Gastrointestinal: Negative for abdominal pain, constipation, diarrhea and nausea.   Genitourinary: Negative for difficulty urinating, dyspareunia, dysuria, pelvic pain, urgency, vaginal bleeding, vaginal discharge and vaginal pain.   Neurological: Negative for headaches.   Psychiatric/Behavioral: Negative for dysphoric mood. The patient is not nervous/anxious.    All other systems reviewed and are negative.     Breast ROS: negative    The following portions of the patient's history were reviewed and updated as appropriate: allergies, current medications, past family history, past medical history, past social history, past surgical history and problem  list.    No Known Allergies       Current Outpatient Prescriptions:   •  aspirin 81 MG EC tablet, Take 81 mg by mouth Daily., Disp: , Rfl:   •  Cholecalciferol (VITAMIN D3) 5000 UNITS tablet, Take 1 tablet by mouth Daily., Disp: , Rfl:   •  Cyanocobalamin (B12 LIQUID HEALTH BOOSTER PO), Take  by mouth., Disp: , Rfl:   •  estradiol (MINIVELLE, VIVELLE-DOT) 0.1 MG/24HR patch, Place 1 patch on the skin 2 (Two) Times a Week., Disp: 8 patch, Rfl: 12  •  eszopiclone (LUNESTA) 3 MG tablet, Take 1 tablet by mouth Every Night. Take immediately before bedtime, Disp: 30 tablet, Rfl: 5  •  fexofenadine (ALLEGRA) 180 MG tablet, Take 1 tablet by mouth Daily., Disp: 90 tablet, Rfl: 3  •  glycopyrrolate (ROBINUL) 1 MG tablet, Take 1 tablet by mouth 2 (Two) Times a Day., Disp: 60 tablet, Rfl: 12  •  hydrochlorothiazide (HYDRODIURIL) 25 MG tablet, Take 1 tablet by mouth Daily., Disp: 90 tablet, Rfl: 3  •  lisinopril (PRINIVIL,ZESTRIL) 40 MG tablet, Take 1 tablet by mouth Daily., Disp: 90 tablet, Rfl: 3  •  metFORMIN (GLUCOPHAGE) 500 MG tablet, Take 2 tablets po bid (Patient taking differently: Take 3 Tabs daily), Disp: 120 tablet, Rfl: 11  •  pseudoephedrine (SUDAFED) 30 MG tablet, Take 30 mg by mouth Every 4 (Four) Hours As Needed for congestion., Disp: , Rfl:   •  meloxicam (MOBIC) 15 MG tablet, Take 1 tablet by mouth Daily., Disp: 90 tablet, Rfl: 4  •  Mirabegron ER (MYRBETRIQ) 50 MG tablet sustained-release 24 hour 24 hr tablet, Take 50 mg by mouth Daily., Disp: 90 tablet, Rfl: 4     The patient has a family history of   No family history on file.     Past Medical History:   Diagnosis Date   • Essential hypertension 4/25/2017   • Insomnia due to medical condition 8/28/2017   • Insulin resistance 4/25/2017   • Menopausal and female climacteric states 4/25/2017   • Obesity (BMI 30.0-34.9) 4/25/2017   • Overactive bladder 8/28/2017   • Overweight (BMI 25.0-29.9) 6/26/2017        OB History     No data available           Social  "History     Social History   • Marital status: Single     Spouse name: N/A   • Number of children: N/A   • Years of education: N/A     Occupational History   • Not on file.     Social History Main Topics   • Smoking status: Never Smoker   • Smokeless tobacco: Never Used   • Alcohol use No   • Drug use: No   • Sexual activity: Not on file     Other Topics Concern   • Not on file     Social History Narrative        No past surgical history on file.     Patient Active Problem List   Diagnosis   • Menopausal and female climacteric states   • Essential hypertension   • Insulin resistance   • Overweight (BMI 25.0-29.9)   • Overactive bladder   • Insomnia due to medical condition        Documented Vitals    11/01/17 1042   BP: 143/75   Weight: 184 lb (83.5 kg)   Height: 66\" (167.6 cm)   PainSc: 0-No pain       Physical Exam   Constitutional: She is oriented to person, place, and time. No distress.   Overweight white female weighing 184 pounds with BMI 29.7.   HENT:   Head: Normocephalic and atraumatic.   Eyes: Conjunctivae and EOM are normal. Pupils are equal, round, and reactive to light.   Neck: Normal range of motion. Neck supple. No JVD present. No tracheal deviation present. No thyromegaly present.   Cardiovascular: Normal rate, regular rhythm, normal heart sounds and intact distal pulses.  Exam reveals no gallop and no friction rub.    No murmur heard.  Pulmonary/Chest: Effort normal and breath sounds normal. No stridor. No respiratory distress. She has no wheezes. She has no rales. She exhibits no tenderness.   Abdominal: Soft. Bowel sounds are normal. She exhibits no distension and no mass. There is no tenderness. There is no rebound and no guarding. No hernia.   Musculoskeletal: Normal range of motion. She exhibits no edema, tenderness or deformity.   Lymphadenopathy:     She has no cervical adenopathy.   Neurological: She is alert and oriented to person, place, and time. She has normal reflexes. She displays " normal reflexes. No cranial nerve deficit. She exhibits normal muscle tone. Coordination normal.   Skin: Skin is warm and dry. No rash noted. She is not diaphoretic. No erythema. No pallor.   Psychiatric: She has a normal mood and affect. Her behavior is normal. Judgment and thought content normal.   Nursing note and vitals reviewed.       Assessment        Diagnosis Plan   1. Overactive bladder     2. Menopausal and female climacteric states     3. Insomnia due to medical condition     4. Essential hypertension           Plan      1. Refilled Myrbetriq 50 mg and Mobic 15mg.  2. Continue monthly estradiol shot.  3. Continue other medications.  4. Encouraged in diet and exercise.   5. Follow-up in 3 months.  Follow-up sooner as needed.            This document has been electronically signed by Abdifatah Bell MD on November 1, 2017 7:23 PM

## 2018-01-19 ENCOUNTER — DOCUMENTATION (OUTPATIENT)
Dept: OBSTETRICS AND GYNECOLOGY | Facility: CLINIC | Age: 58
End: 2018-01-19

## 2018-01-19 NOTE — PROGRESS NOTES
Received fax from [facility] regarding patient being on Myrbetriq 50 mg. [facility] fax states they can save patient $240 a year if we change patients medication. Per Dr. Bell, we can send in Tolterodine Tart Er 4mg (which is listed on the paper provided by [facility]). I called patient to see what she prefers. If she wishes to continue with Mybetriq or switch to Tolterodine to try and save money. Patient states the Mybetriq is not really working for her. She says she has an appointment on 2/5/18 and she would discuss her options when she comes in for that appointment. She also says (when she comes in for her appointment) she may take a written script of Tolterodine to her local pharmacy for one month to see if it saves her money and if the medication will work for her.

## 2018-02-05 ENCOUNTER — OFFICE VISIT (OUTPATIENT)
Dept: OBSTETRICS AND GYNECOLOGY | Facility: CLINIC | Age: 58
End: 2018-02-05

## 2018-02-05 VITALS
WEIGHT: 181 LBS | BODY MASS INDEX: 29.09 KG/M2 | DIASTOLIC BLOOD PRESSURE: 79 MMHG | HEIGHT: 66 IN | SYSTOLIC BLOOD PRESSURE: 122 MMHG

## 2018-02-05 DIAGNOSIS — I10 ESSENTIAL HYPERTENSION: Chronic | ICD-10-CM

## 2018-02-05 DIAGNOSIS — N32.81 OVERACTIVE BLADDER: Primary | ICD-10-CM

## 2018-02-05 DIAGNOSIS — N32.81 OVERACTIVE BLADDER: Chronic | ICD-10-CM

## 2018-02-05 DIAGNOSIS — E88.81 INSULIN RESISTANCE: Chronic | ICD-10-CM

## 2018-02-05 DIAGNOSIS — N95.1 SYMPTOMATIC MENOPAUSAL OR FEMALE CLIMACTERIC STATES: Primary | ICD-10-CM

## 2018-02-05 DIAGNOSIS — G47.01 INSOMNIA DUE TO MEDICAL CONDITION: Chronic | ICD-10-CM

## 2018-02-05 PROCEDURE — 96372 THER/PROPH/DIAG INJ SC/IM: CPT | Performed by: OBSTETRICS & GYNECOLOGY

## 2018-02-05 PROCEDURE — 99214 OFFICE O/P EST MOD 30 MIN: CPT | Performed by: OBSTETRICS & GYNECOLOGY

## 2018-02-05 RX ORDER — IPRATROPIUM BROMIDE 21 UG/1
2 SPRAY, METERED NASAL EVERY 12 HOURS
Qty: 30 ML | Refills: 12 | Status: SHIPPED | OUTPATIENT
Start: 2018-02-05 | End: 2018-10-17

## 2018-02-05 NOTE — PROGRESS NOTES
Saniya Lundberg is a 57 y.o. y/o female     Chief Complaint: Menopausal syndrome, overactive bladder, insomnia, insulin resistance.    HPI: 57-year-old  with two daughters.  She has had a hysterectomy and BSO.       She is taking metformin 500 mg three times a day. At first the metformin caused nausea, but it is not currently causing her any problems.       She was having problems with her blood pressure getting too low, and she stopped taking her lisinopril.  She then noticed that her blood pressure was a little elevated, and she went back to the lisinopril 40 mg per day.  She is also on hydrochlorothiazide 25 mg per day.      She takes vitamin D3 5000 international units per day. She admits to very little exercise. She had been on a low-carb diet.       We further discussed insulin resistance.  We also discussed how estradiol levels fluctuate based on body fat.      She has now had two estradiol hormone shots, and her problems with hot flashes are improving.     Myrbetriq 50 mg was working fairly well, but now it does not seem to be working as well.  She has noticed that she gets some improvement with her urinary symptoms with some of the back exercises she does.  She would like to see Yris Villavicencio in physical therapy, and we will put in a referral for that.      She needs refills of her ipratropium bromide nasal solution.      Review of Systems   Constitutional: Positive for fatigue. Negative for activity change, appetite change, chills, diaphoresis, fever and unexpected weight change.   Gastrointestinal: Negative for abdominal pain, constipation, diarrhea and nausea.   Genitourinary: Negative for difficulty urinating, dyspareunia, dysuria, pelvic pain, urgency, vaginal bleeding, vaginal discharge and vaginal pain.   Neurological: Negative for headaches.   Psychiatric/Behavioral: Negative for dysphoric mood. The patient is not nervous/anxious.    All other systems reviewed and are negative.     Breast  ROS: negative    The following portions of the patient's history were reviewed and updated as appropriate: allergies, current medications, past family history, past medical history, past social history, past surgical history and problem list.    No Known Allergies       Current Outpatient Prescriptions:   •  aspirin 81 MG EC tablet, Take 81 mg by mouth Daily., Disp: , Rfl:   •  Cholecalciferol (VITAMIN D3) 5000 UNITS tablet, Take 1 tablet by mouth Daily., Disp: , Rfl:   •  Cyanocobalamin (B12 LIQUID HEALTH BOOSTER PO), Take  by mouth., Disp: , Rfl:   •  estradiol (MINIVELLE, VIVELLE-DOT) 0.1 MG/24HR patch, Place 1 patch on the skin 2 (Two) Times a Week., Disp: 8 patch, Rfl: 12  •  eszopiclone (LUNESTA) 3 MG tablet, Take 1 tablet by mouth Every Night. Take immediately before bedtime, Disp: 30 tablet, Rfl: 5  •  fexofenadine (ALLEGRA) 180 MG tablet, Take 1 tablet by mouth Daily., Disp: 90 tablet, Rfl: 3  •  glycopyrrolate (ROBINUL) 1 MG tablet, Take 1 tablet by mouth 2 (Two) Times a Day., Disp: 60 tablet, Rfl: 12  •  hydrochlorothiazide (HYDRODIURIL) 25 MG tablet, Take 1 tablet by mouth Daily., Disp: 90 tablet, Rfl: 3  •  lisinopril (PRINIVIL,ZESTRIL) 40 MG tablet, Take 1 tablet by mouth Daily., Disp: 90 tablet, Rfl: 3  •  meloxicam (MOBIC) 15 MG tablet, Take 1 tablet by mouth Daily., Disp: 90 tablet, Rfl: 4  •  metFORMIN (GLUCOPHAGE) 500 MG tablet, Take 2 tablets po bid (Patient taking differently: Take 3 Tabs daily), Disp: 120 tablet, Rfl: 11  •  Mirabegron ER (MYRBETRIQ) 50 MG tablet sustained-release 24 hour 24 hr tablet, Take 50 mg by mouth Daily., Disp: 90 tablet, Rfl: 4  •  pseudoephedrine (SUDAFED) 30 MG tablet, Take 30 mg by mouth Every 4 (Four) Hours As Needed for congestion., Disp: , Rfl:   •  ipratropium (ATROVENT) 0.03 % nasal spray, 2 sprays into each nostril Every 12 (Twelve) Hours., Disp: 30 mL, Rfl: 12  No current facility-administered medications for this visit.      The patient has a family history  "of   No family history on file.     Past Medical History:   Diagnosis Date   • Essential hypertension 4/25/2017   • Insomnia due to medical condition 8/28/2017   • Insulin resistance 4/25/2017   • Menopausal and female climacteric states 4/25/2017   • Obesity (BMI 30.0-34.9) 4/25/2017   • Overactive bladder 8/28/2017   • Overweight (BMI 25.0-29.9) 6/26/2017        OB History     No data available           Social History     Social History   • Marital status: Single     Spouse name: N/A   • Number of children: N/A   • Years of education: N/A     Occupational History   • Not on file.     Social History Main Topics   • Smoking status: Never Smoker   • Smokeless tobacco: Never Used   • Alcohol use No   • Drug use: No   • Sexual activity: Not on file     Other Topics Concern   • Not on file     Social History Narrative        No past surgical history on file.     Patient Active Problem List   Diagnosis   • Menopausal and female climacteric states   • Essential hypertension   • Insulin resistance   • Overweight (BMI 25.0-29.9)   • Overactive bladder   • Insomnia due to medical condition        Documented Vitals    02/05/18 0932   BP: 122/79   Weight: 82.1 kg (181 lb)   Height: 167.6 cm (66\")   PainSc: 0-No pain       Physical Exam   Constitutional: She is oriented to person, place, and time. No distress.   Overweight white female weighing 181 pounds with BMI 29.2   HENT:   Head: Normocephalic and atraumatic.   Eyes: Conjunctivae and EOM are normal. Pupils are equal, round, and reactive to light.   Neck: Normal range of motion. Neck supple. No JVD present. No tracheal deviation present. No thyromegaly present.   Cardiovascular: Normal rate, regular rhythm, normal heart sounds and intact distal pulses.  Exam reveals no gallop and no friction rub.    No murmur heard.  Pulmonary/Chest: Effort normal and breath sounds normal. No stridor. No respiratory distress. She has no wheezes. She has no rales. She exhibits no " tenderness.   Abdominal: Soft. Bowel sounds are normal. She exhibits no distension and no mass. There is no tenderness. There is no rebound and no guarding. No hernia.   Musculoskeletal: Normal range of motion. She exhibits no edema, tenderness or deformity.   Lymphadenopathy:     She has no cervical adenopathy.   Neurological: She is alert and oriented to person, place, and time. She has normal reflexes. She displays normal reflexes. No cranial nerve deficit. She exhibits normal muscle tone. Coordination normal.   Skin: Skin is warm and dry. No rash noted. She is not diaphoretic. No erythema. No pallor.   Psychiatric: She has a normal mood and affect. Her behavior is normal. Judgment and thought content normal.   Nursing note and vitals reviewed.       Assessment        Diagnosis Plan   1. Symptomatic menopausal or female climacteric states  estradiol cypionate (DEPO-ESTRADIOL) 5 MG/ML injection 5 mg   2. Insomnia due to medical condition     3. Essential hypertension     4. Overactive bladder     5. Insulin resistance           Plan      1. Ipratropium bromide nasal solution.  2. Referral to Yris Villavicencio for pelvic floor physical therapy.  3. Continue current medications.  4. Encouraged in diet and exercise.   5. Follow-up in 3 months.  Follow-up sooner as needed.            This document has been electronically signed by Abdifatah Bell MD on February 5, 2018 10:11 AM

## 2018-02-13 ENCOUNTER — HOSPITAL ENCOUNTER (OUTPATIENT)
Dept: PHYSICAL THERAPY | Facility: HOSPITAL | Age: 58
Setting detail: THERAPIES SERIES
Discharge: HOME | End: 2018-02-13

## 2018-02-13 DIAGNOSIS — R32 URINARY INCONTINENCE IN FEMALE: ICD-10-CM

## 2018-02-13 DIAGNOSIS — N32.81 OVERACTIVE BLADDER: Primary | ICD-10-CM

## 2018-02-13 PROCEDURE — 97112 NEUROMUSCULAR REEDUCATION: CPT | Performed by: PHYSICAL THERAPIST

## 2018-02-13 PROCEDURE — 97162 PT EVAL MOD COMPLEX 30 MIN: CPT | Performed by: PHYSICAL THERAPIST

## 2018-02-13 NOTE — THERAPY EVALUATION
Outpatient Physical Therapy Women's Health Initial Evaluation  [facility]     Patient Name: Saniya Lundberg  : 1960  MRN: [id number]  Today's Date: 2018        Visit Date: 2018  Visit Number:   Recheck: 3/8/18  RTD: none      Patient Active Problem List   Diagnosis   • Menopausal and female climacteric states   • Essential hypertension   • Insulin resistance   • Overweight (BMI 25.0-29.9)   • Overactive bladder   • Insomnia due to medical condition        Past Medical History:   Diagnosis Date   • Cancer     Basal cell carcinoma face/neck   • Essential hypertension 2017   • Insomnia due to medical condition 2017   • Insulin resistance 2017   • Menopausal and female climacteric states 2017   • Obesity (BMI 30.0-34.9) 2017   • Overactive bladder 2017   • Overweight (BMI 25.0-29.9) 2017   • Spondylolisthesis at L5-S1 level         Past Surgical History:   Procedure Laterality Date   • EYE SURGERY      Lasik bilaterally   • HYSTERECTOMY      Complete   • TONSILLECTOMY     • WRIST SURGERY      cyst removal L          Visit Dx:    ICD-10-CM ICD-9-CM   1. Overactive bladder N32.81 596.51   2. Urinary incontinence in female R32 788.30                 Women's Health       18 1100          Pregnancy Questions    Number of Pregnancies 4  -SW      Number of Children 2  -SW      Type of Previous Deliveries Vaginal  -SW      Pelvic Floor Muscle    Strength (Right) 3: Squeeze with/without lift  -SW      Strength (Left) 3: Squeeze with/without lift  -SW      Symmetry of Sustained Maximal Contraction Symmetrical  -SW      Endurance (Ability to Hold Maximal Contraction) 3 sec  -SW      # of Reps of Maximal Contractions while Maintaining Endurance and Strength 4 sets  -SW      Fast Contraction (# of 1 sec contractions performed) 6  -SW      Interior Muscle Comments No tenderness observed with intravaginal cuff palpation.  Labia minora presents with skin  flap on L side, larger than R minora.  Normal hair growth observed.  No vaginal discharge or other abnormalities noted.  Slight descension of urethra to forefront of introitus. No external hemorrhoids.    -SW      Perineal Observation    Perineal Observation Performed? Yes  -SW      Observation of Contraction in Perineum    Anal [place] Present  -SW      Perineal Body Lift Present  -SW      Pelvic Floor    Ability to Isolate Contraction of Pelvic Floor Yes   prior to cues, used TA   -SW      Overflow from Adjacent Muscles Upper Abdominals  -SW      Cough    Abdominal Contraction Yes  -SW      Leak None  -SW      Bulge Yes  -SW      Prolapse (Pop-Q)    Cystocele Stage II  -SW      SEMG Evaluation    SEMG Evaluation Comments Declined this date.  -SW      Education Provided On:    Education Points HEP;Behavioral modifications;Other (comment)   bladder diary.  -SW      Method of Delivery Verbal;Demonstration;Written  -SW      Education Provided To Patient  -SW      Level of Understanding Teach back education performed;Verbalized;Demonstrated  -SW      HEP Comments Bladder diary completion; neuro tani for bladder emptying and positioning.  -        User Key  (r) = Recorded By, (t) = Taken By, (c) = Cosigned By    Initials Name Provider Type    SW Yris Villavicencio, PT Physical Therapist              PT Ortho       02/13/18 1100    Subjective Comments    Subjective Comments Urinary leakage and overactive bladder present x years.  Also has chronic history of LB problems due to spondylolisthesis.  Underwent PT for LB and noted improvements in s/s with bladder during that time (Sushil/Aug 2017).  Noted less leaking and stopped wearing pads.  Upon DC stopped doing exercise and resulted to same s/s.  Now results in urgency to bladder.  Can't hardly shop due to running to bathroom all the time.  Void  schedule: at least every 30 minutes.  Nocturia: 2/night.  No hesitancy with flow.  More difficulty noted with holding bladder until  gets to bathroom.  Doesn't feel she empties bladder well.  Notes that stream starts again after wiping many of the time.  Leakage: at least every hour.  Padding all day but none at night.  Bladder control pads utilized.  Bowels: move daily, usually easy to pass.  [place] Stool scale: 4.    -SW    Subjective Pain    Able to rate subjective pain? yes  -SW    Pre-Treatment Pain Level 0  -SW    Post-Treatment Pain Level 0  -SW    Posture/Observations    Posture- WNL Posture is WNL  -SW    Posture/Observations Comments Gait and transfers are normal.    -      User Key  (r) = Recorded By, (t) = Taken By, (c) = Cosigned By    Initials Name Provider Type     Yris Villavicencio, PT Physical Therapist                           PT Assessment/Plan       02/13/18 1300       PT Assessment    Functional Limitations Performance in self-care ADL;Performance in leisure activities;Performance in work activities  -     Impairments Impaired muscle endurance;Impaired muscle power;Muscle strength  -     Assessment Comments Patient is a 56 yo female presenting to clinic with urinary urgency and overactivity of bladder.  She works in field of [employer] which requires frequent travel in car.  Problems have affected her ability to travel and maintain any quality of life due to continual urge to urination, not to mention, the UI which is becoming multiple times per day.  She is an excellent PF rehab candidate that would benefit from behavioral training to PF to help improve continence, bladder control and QOL in general.    -     Rehab Potential Good  -     Patient/caregiver participated in establishment of treatment plan and goals Yes  -SW     Patient would benefit from skilled therapy intervention Yes  -SW     PT Plan    PT Frequency 1x/week  -SW     Predicted Duration of Therapy Intervention (days/wks) 8 visits  -     PT Plan Comments behavioral management with bladder retraining program.  PF uptraining to control urge,  improve bladder emptying, and inc supportive function of PF.  Next visit: review diary and complete sEMG to PF.  -SW       User Key  (r) = Recorded By, (t) = Taken By, (c) = Cosigned By    Initials Name Provider Type    ORTIZ Villavicencio, PT Physical Therapist                  Exercises       02/13/18 1100          Subjective Comments    Subjective Comments Urinary leakage and overactive bladder present x years.  Also has chronic history of LB problems due to spondylolisthesis.  Underwent PT for LB and noted improvements in s/s with bladder during that time (Sushil/Aug 2017).  Noted less leaking and stopped wearing pads.  Upon DC stopped doing exercise and resulted to same s/s.  Now results in urgency to bladder.  Can't hardly shop due to running to bathroom all the time.  Void  schedule: at least every 30 minutes.  Nocturia: 2/night.  No hesitancy with flow.  More difficulty noted with holding bladder until gets to bathroom.  Doesn't feel she empties bladder well.  Notes that stream starts again after wiping many of the time.  Leakage: at least every hour.  Padding all day but none at night.  Bladder control pads utilized.  Bowels: move daily, usually easy to pass.  [place] Stool scale: 4.    -SW      Subjective Pain    Able to rate subjective pain? yes  -SW      Pre-Treatment Pain Level 0  -SW      Post-Treatment Pain Level 0  -SW      Exercise 1    Exercise Name 1 pelvic floor isolated contraction  -SW      Time (Minutes) 1 6  -SW      Additional Comments able to isolate PF contraction with verbal cues and tactile facilitation.  Originally used TA but able to quite TA with isolated contraction post training.  -SW      Exercise 2    Exercise Name 2 toileting position with QF to conclude to promote bladder emptying.  -SW      Additional Comments Pelvic anatomy described this visit with analysis of positioning.  Patient received demonstration with teach-back of current techniques with good success.  Verbalized  understanding in the importance of positioning and demonstrated independence with completion of technique.  -        User Key  (r) = Recorded By, (t) = Taken By, (c) = Cosigned By    Initials Name Provider Type    ORTIZ Villavicencio, PT Physical Therapist                            PT OP Goals       02/13/18 1300       PT Short Term Goals    STG Date to Achieve 03/09/18  -SW     STG 1 independent with bladder diary completion  -SW     STG 1 Progress New  -     STG 2 reduced dietary irritants and eliminate trigger foods for bladder stimulation  -SW     STG 2 Progress New  -     STG 3 Patient to maintain void schedule at 1 hour increments to encourage normal progression toward normal bladder emptying of 3-4 hours during waking day.  -SW     STG 3 Progress New  -     STG 4 patient to comply with toileting position for improved evacuation of bladder to allow improved emptying post void.  -SW     STG 4 Progress New  -     STG 5 Demonstrate normal coordination of PF contraction via sEMG with rapid activation and rapid return to baseline post contraction x 10 reps in supine position.  -     STG 5 Progress New  -     Long Term Goals    LTG Date to Achieve 04/30/18  -SW     LTG 1 Subjectively improve 75% with overall normal bladder control  -SW     LTG 1 Progress New  -     LTG 2 void schedule 3-4 hours during the daytime waking hours.  -SW     LTG 2 Progress New  -     LTG 3 Urinary leakage reduced such that poise pad not required for daily use.  -SW     LTG 3 Progress New  -     LTG 4 promote and maintain adequate hydration and dietary intake such that [place] stool scale improves to 3/4 consistently, daily, without straining required.  -     LTG 4 Progress New  -     Time Calculation    PT Goal Re-Cert Due Date 03/09/18  -       User Key  (r) = Recorded By, (t) = Taken By, (c) = Cosigned By    Initials Name Provider Type    ORTIZ Villavicencio, PT Physical Therapist          Therapy  Education  Given: HEP, Symptoms/condition management  Program: New  How Provided: Verbal, Demonstration, Written  Provided to: Patient  Level of Understanding: Teach back education performed, Verbalized, Demonstrated               Time Calculation:   Start Time: 1131  Stop Time: 1238  Time Calculation (min): 67 min    Therapy Charges for Today     Code Description Service Date Service Provider Modifiers Qty    82416349004 HC PT THER SUPP EA 15 MIN 2/13/2018 Yris Villavicencio, PT GP 1    58622141785 HC PT NEUROMUSC RE EDUCATION EA 15 MIN 2/13/2018 Yris Villavicencio, PT GP 1    [id number]  PT EVAL MOD COMPLEXITY 4 2/13/2018 Yris Villavicencio, PT GP 1                  Yris Villavicencio, PT  2/13/2018

## 2018-02-22 ENCOUNTER — HOSPITAL ENCOUNTER (OUTPATIENT)
Dept: PHYSICAL THERAPY | Facility: HOSPITAL | Age: 58
Setting detail: THERAPIES SERIES
Discharge: HOME | End: 2018-02-22

## 2018-02-22 DIAGNOSIS — N32.81 OVERACTIVE BLADDER: ICD-10-CM

## 2018-02-22 DIAGNOSIS — R32 URINARY INCONTINENCE IN FEMALE: Primary | ICD-10-CM

## 2018-02-22 PROCEDURE — 97112 NEUROMUSCULAR REEDUCATION: CPT | Performed by: PHYSICAL THERAPIST

## 2018-02-22 PROCEDURE — 97110 THERAPEUTIC EXERCISES: CPT | Performed by: PHYSICAL THERAPIST

## 2018-02-22 NOTE — THERAPY TREATMENT NOTE
Outpatient Physical Therapy Women's Health Treatment Note  [facility]     Patient Name: Saniya Lundberg  : 1960  MRN: [id number]  Today's Date: 2018        Visit Date: 2018  Visit Number:   Recheck: 3/8/18  RTD: none    Subjective Improvement: 0%    Visit Dx:    ICD-10-CM ICD-9-CM   1. Urinary incontinence in female R32 788.30   2. Overactive bladder N32.81 596.51       Patient Active Problem List   Diagnosis   • Menopausal and female climacteric states   • Essential hypertension   • Insulin resistance   • Overweight (BMI 25.0-29.9)   • Overactive bladder   • Insomnia due to medical condition              Women's Health       18 1600          Pregnancy Questions    Number of Pregnancies 4  -SW      Number of Children 2  -SW      Type of Previous Deliveries Vaginal  -SW      Subjective Pain    Able to rate subjective pain? yes  -SW      Pre-Treatment Pain Level 0  -SW      Post-Treatment Pain Level 0  -SW      Pelvic Floor Muscle    Strength (Right) 3: Squeeze with/without lift  -SW      Strength (Left) 3: Squeeze with/without lift  -SW      Symmetry of Sustained Maximal Contraction Symmetrical  -SW      Endurance (Ability to Hold Maximal Contraction) 3 sec  -SW      # of Reps of Maximal Contractions while Maintaining Endurance and Strength 4 sets  -SW      Fast Contraction (# of 1 sec contractions performed) 6  -SW      Interior Muscle Comments No internal muscle assessment completed this date.  -SW      Perineal Observation    Perineal Observation Performed? Yes  -SW      Observation of Contraction in Perineum    Anal [place] Present  -SW      Perineal Body Lift Present  -SW      Pelvic Floor    Ability to Isolate Contraction of Pelvic Floor Yes   prior to cues, used TA   -SW      Overflow from Adjacent Muscles Upper Abdominals  -SW      Cough    Abdominal Contraction Yes  -SW      Leak None  -SW      Bulge Yes  -SW      Prolapse (Pop-Q)    Cystocele Stage II  -SW      SEMG  Evaluation    SEMG Evaluation Comments Declined this date due to behavioral management training completed this date.    -SW      Education Provided On:    Education Points HEP;Behavioral modifications;Other (comment)   bladder diary.  -SW      Method of Delivery Verbal;Demonstration;Written  -SW      Education Provided To Patient  -SW      Level of Understanding Teach back education performed;Verbalized;Demonstrated  -SW      HEP Comments Void schedule 1.5 hours. Dec dietary irritants.  Avg 70 oz hydration goal approx 45 non irritant.  -SW        User Key  (r) = Recorded By, (t) = Taken By, (c) = Cosigned By    Initials Name Provider Type    ORTIZ Villavicencio PT Physical Therapist              PT Ortho       02/22/18 1600    Subjective Comments    Subjective Comments Completed bladder diary for review.  Believes it will be a lot of mind over matter.  Tried techniques taught and was amazed at progress.  Am going more than she originally thought in terms of volume.  Also noted one day to have drank a ton more and leaked less.  Noted to be a day when she consumed greater amounts of good hydration.  Patient has been encouraged by results already.  -SW    Posture/Observations    Posture- WNL Posture is WNL  -SW    Posture/Observations Comments Bladder dairy brought in for review.  Objective summary in notes below.  -SW      User Key  (r) = Recorded By, (t) = Taken By, (c) = Cosigned By    Initials Name Provider Type    ORTIZ Villavicencio PT Physical Therapist        Bladder and/or Bowel Diary was reviewed this date and education completed as follows based on objective information given in diary.    Normal bladder and bowel anatomy was introduced as to better understand their function.  The normal voiding range was given with average of 6-8 times during a 24 hour period, with average of nocturia up to 1 per night post menopausal.  Urine stream was discussed as to not force, push, or strain to initiate or empty  the flow of urine.  Proper toileting was reviewed as to improve overall evacuation for bladder and bowel systems.  Patient demonstrated this technique via verbal and demonstrative techniques shown by the therapist.  Three primary functions of the pelvic floor were discussed to include 1) sexual appreciation, 2) support of internal structures, and 3) sphincteric control.  Additionally, normal bladder and bowel function was discussed and patient's values, as per his/her diary, were presented and compared to that of normal function.    Patient's diary reveals 9-13 voids total per day with 1-2 average episodes of nocturia per night.  Patient void schedule varied with increments of 1-3 hour increments.  Patient presented with 1-5 episodes of leakage per day.    Patient's level of urgency ranged from 2-3 in terms of strength with 1=min urge, 2=moderate urge and 3=strong urge.  Patient received education on the urge signal that one feels as the bladder wall stretches to fill with urine.  Three stage scale given with descriptor of the stages identified as stated above.  Urge suppression techniques to help control the urge and behaviorally manage urination and/or bowel schedule were taught during this time.    Good fluid intake was discussed as to aid in the promotion of good bladder health.  Hydration formula using body weight guide was used to calculate potential need for full hydration.  Per the bladder review, the patient consumed  total fluid oz per day with majority oz consisting of non-irritating fluid.  Per the formula, a hydration goal of 70 oz per day with 25 oz irritant fluids based upon body weight formula.    Dietary intake was discussed this visit as to how it too can affect the overall bladder health.  Handout was issued, as well as thorough discussion given as to irritants to bladder and how to categorize those irritants to improve overall bladder health. Suggestions were given based on response of  patient's dietary intake per diary.                   PT Assessment/Plan       02/22/18 1600       PT Assessment    Functional Limitations Performance in self-care ADL;Performance in leisure activities;Performance in work activities  -     Impairments Impaired muscle endurance;Impaired muscle power;Muscle strength  -     Assessment Comments Patient noted to include dietary irritants into intake forms of diary for bladder.  Fairly good consumption of water and non-irritants, but does have positive irritants noted.  Good compliance with program.  Feel she will do well with program.  STG #1 met this date.  -SW     Rehab Potential Good  -SW     Patient/caregiver participated in establishment of treatment plan and goals Yes  -SW     Patient would benefit from skilled therapy intervention Yes  -SW     PT Plan    PT Frequency 1x/week  -SW     Predicted Duration of Therapy Intervention (days/wks) 8 visits  -     PT Plan Comments Bladder void 1.5 hours.  Dec irritants and improve hydration with goal of 70 oz/day.  Begin SEMG assessment next visit with HEP assignment.   -       User Key  (r) = Recorded By, (t) = Taken By, (c) = Cosigned By    Initials Name Provider Type    ORTIZ Villavicencio, PT Physical Therapist                      Exercises       02/22/18 1600          Subjective Comments    Subjective Comments Completed bladder diary for review.  Believes it will be a lot of mind over matter.  Tried techniques taught and was amazed at progress.  Am going more than she originally thought in terms of volume.  Also noted one day to have drank a ton more and leaked less.  Noted to be a day when she consumed greater amounts of good hydration.  Patient has been encouraged by results already.  -SW      Subjective Pain    Able to rate subjective pain? yes  -SW      Pre-Treatment Pain Level 0  -SW      Post-Treatment Pain Level 0  -SW        User Key  (r) = Recorded By, (t) = Taken By, (c) = Cosigned By    Initials  Name Provider Type    ORTIZ Villavicencio, PT Physical Therapist                            PT OP Goals       02/22/18 1600       PT Short Term Goals    STG Date to Achieve 03/09/18  -     STG 1 independent with bladder diary completion  -     STG 1 Progress Met  -     STG 2 reduced dietary irritants and eliminate trigger foods for bladder stimulation  -     STG 2 Progress New  -     STG 3 Patient to maintain void schedule at 1 hour increments to encourage normal progression toward normal bladder emptying of 3-4 hours during waking day.  -SW     STG 3 Progress New  -     STG 4 patient to comply with toileting position for improved evacuation of bladder to allow improved emptying post void.  -SW     STG 4 Progress New  -     STG 5 Demonstrate normal coordination of PF contraction via sEMG with rapid activation and rapid return to baseline post contraction x 10 reps in supine position.  -     STG 5 Progress New  -     Long Term Goals    LTG Date to Achieve 04/30/18  -     LTG 1 Subjectively improve 75% with overall normal bladder control  -     LTG 1 Progress New  -     LTG 2 void schedule 3-4 hours during the daytime waking hours.  -     LTG 2 Progress New  -     LTG 3 Urinary leakage reduced such that poise pad not required for daily use.  -     LTG 3 Progress New  -     LTG 4 promote and maintain adequate hydration and dietary intake such that [place] stool scale improves to 3/4 consistently, daily, without straining required.  -     LTG 4 Progress New  [facility]     Time Calculation    PT Goal Re-Cert Due Date 03/09/18  -       User Key  (r) = Recorded By, (t) = Taken By, (c) = Cosigned By    Initials Name Provider Type    ORTIZ Villavicencio, PT Physical Therapist           Therapy Education  Given: Symptoms/condition management, HEP  Program: New  How Provided: Verbal, Demonstration, Written  Provided to: Patient  Level of Understanding: Teach back education performed,  Verbalized, Demonstrated              Time Calculation:   Start Time: 1610  Stop Time: 1720  Time Calculation (min): 70 min    Therapy Charges for Today     Code Description Service Date Service Provider Modifiers Qty    15824550631 HC PT NEUROMUSC RE EDUCATION EA 15 MIN 2/22/2018 Yris Villavicencio, PT GP 5                    Yris Villavicencio, PT  2/22/2018

## 2018-03-01 ENCOUNTER — HOSPITAL ENCOUNTER (OUTPATIENT)
Dept: PHYSICAL THERAPY | Facility: HOSPITAL | Age: 58
Setting detail: THERAPIES SERIES
Discharge: HOME | End: 2018-03-01

## 2018-03-01 DIAGNOSIS — N32.81 OVERACTIVE BLADDER: ICD-10-CM

## 2018-03-01 DIAGNOSIS — R32 URINARY INCONTINENCE IN FEMALE: Primary | ICD-10-CM

## 2018-03-01 PROCEDURE — 97110 THERAPEUTIC EXERCISES: CPT | Performed by: PHYSICAL THERAPIST

## 2018-03-01 NOTE — THERAPY TREATMENT NOTE
Outpatient Physical Therapy Women's Health Treatment Note  [facility]     Patient Name: Saniya Lundberg  : 1960  MRN: [id number]  Today's Date: 3/1/2018        Visit Date: 2018  Visit Number: 3/3  Recheck: 3/8/18  RTD: none    Subjective Improvement: 40-50%    Visit Dx:    ICD-10-CM ICD-9-CM   1. Urinary incontinence in female R32 788.30   2. Overactive bladder N32.81 596.51       Patient Active Problem List   Diagnosis   • Menopausal and female climacteric states   • Essential hypertension   • Insulin resistance   • Overweight (BMI 25.0-29.9)   • Overactive bladder   • Insomnia due to medical condition              Women's Health       18 1600          Pregnancy Questions    Number of Pregnancies 4  -SW      Number of Children 2  -SW      Type of Previous Deliveries Vaginal  -SW      Subjective Pain    Able to rate subjective pain? yes  -SW      Pre-Treatment Pain Level 0  -SW      Post-Treatment Pain Level 0  -SW      Pelvic Floor Muscle    Strength (Right) 3: Squeeze with/without lift  -SW      Strength (Left) 3: Squeeze with/without lift  -SW      Symmetry of Sustained Maximal Contraction Symmetrical  -SW      Endurance (Ability to Hold Maximal Contraction) 3 sec  -SW      # of Reps of Maximal Contractions while Maintaining Endurance and Strength 4 sets  -SW      Fast Contraction (# of 1 sec contractions performed) 6  -SW      Interior Muscle Comments No tenderness noted to intravaginal cuff.   -SW      Perineal Observation    Perineal Observation Performed? Yes  -SW      Observation of Contraction in Perineum    Anal [place] Present  -SW      Perineal Body Lift Present  -SW      Pelvic Floor    Ability to Isolate Contraction of Pelvic Floor Yes   prior to cues, used TA   -SW      Overflow from Adjacent Muscles Upper Abdominals  -SW      Cough    Abdominal Contraction Yes  -SW      Leak None  -SW      Bulge Yes  -SW      Prolapse (Pop-Q)    Cystocele Stage II  -SW      SEMG  Evaluation    Pelvic Floor Electrode Internal Vaginal  -SW      Baseline Resting Yes  -SW      SEMG Evaluation Comments Maintained normal baselines for resting throughout testing.  Isolated recruitment of PF of good amplitudes of 5-6 mv average for endurance of 5 sec hold x 10 reps x 3 sets.  QF avg 10-12 mv.    -SW      Education Provided On:    Education Points HEP;Behavioral modifications;Other (comment)   bladder diary.  -SW      Method of Delivery Verbal;Demonstration;Written  -SW      Education Provided To Patient  -SW      Level of Understanding Teach back education performed;Verbalized;Demonstrated  -SW      HEP Comments HEP: Endurance 2/day at 10 reps for 5 sec hold x 3 sets.  QF after void x 5 reps.  -SW        User Key  (r) = Recorded By, (t) = Taken By, (c) = Cosigned By    Initials Name Provider Type    ORTIZ Villavicencio, PT Physical Therapist              PT Ortho       03/01/18 1600    Subjective Comments    Subjective Comments Has been doing well.  Finding it hard to keep up with while she is on the road.  Have increased void times since 1:30.  More like 2 hours because of travel.  Notes that overall fluid intake probably less than needed.  Has been getting up once a night usual.  This week notes more bowel movements, possibly due to food irritants.  Feels that overall urge has been better.  I realize know I have control of bladder.  Initially had a hard time with urge suppression, but now feels less urge.  No reports of leakage since last visit.  -SW    Posture/Observations    Posture- WNL Posture is WNL  -SW    Posture/Observations Comments Patient brings new bladder diary to clinic for review.  Void schedule observed to be approx every 1:30 to 2 hours.  No pad since Monday.  -SW      User Key  (r) = Recorded By, (t) = Taken By, (c) = Cosigned By    Initials Name Provider Type    ORTIZ Villavicencio PT Physical Therapist                           PT Assessment/Plan       03/01/18 1600        PT Assessment    Functional Limitations Performance in self-care ADL;Performance in leisure activities;Performance in work activities  -     Impairments Impaired muscle endurance;Impaired muscle power;Muscle strength  -     Assessment Comments Patient is making excellent gains with PT program.  Overactivity of bladder much improved with scheduled void at 2 hour increments.  No leakage reported.  No nocturia > 1/night.  Understands HEP and feel she will comply with recommendations.  Excellent compliance thus far.  All STG met this date.  -     Rehab Potential Good  -     Patient/caregiver participated in establishment of treatment plan and goals Yes  -SW     Patient would benefit from skilled therapy intervention Yes  -SW     PT Plan    PT Frequency Other (comment)   see in two weeks  -     Predicted Duration of Therapy Intervention (days/wks) 8 visits  -     PT Plan Comments Review bladder void. Attempt training in seated position. Revise HEP as needed.    -       User Key  (r) = Recorded By, (t) = Taken By, (c) = Cosigned By    Initials Name Provider Type    ORTIZ Villavicencio, PT Physical Therapist                      Exercises       03/01/18 1600          Subjective Comments    Subjective Comments Has been doing well.  Finding it hard to keep up with while she is on the road.  Have increased void times since 1:30.  More like 2 hours because of travel.  Notes that overall fluid intake probably less than needed.  Has been getting up once a night usual.  This week notes more bowel movements, possibly due to food irritants.  Feels that overall urge has been better.  I realize know I have control of bladder.  Initially had a hard time with urge suppression, but now feels less urge.  No reports of leakage since last visit.  -      Subjective Pain    Able to rate subjective pain? yes  -SW      Pre-Treatment Pain Level 0  -SW      Post-Treatment Pain Level 0  -SW      Exercise 1    Exercise Name 1 PF  resting tone  -SW      Additional Comments supine maintained resting tone of 2.9mv or less consistently.  -      Exercise 2    Exercise Name 2 PF QF contractions   -SW      Sets 2 2  -SW      Reps 2 10  -SW      Additional Comments isolated contractions with good amplitudes of 10-12 mv  -      Exercise 3    Exercise Name 3 PF endurance contractions  -SW      Sets 3 3  -SW      Reps 3 10  -SW      Time (Seconds) 3 5  -SW      Additional Comments Good control and stability to contraction in isolation of PF.  Amplitudes of 5-7 mv.    -        User Key  (r) = Recorded By, (t) = Taken By, (c) = Cosigned By    Initials Name Provider Type    SW Yris Villavicencio, PT Physical Therapist                            PT OP Goals       03/01/18 1600       PT Short Term Goals    STG Date to Achieve 03/09/18  -     STG 1 independent with bladder diary completion  -     STG 1 Progress Met  -     STG 2 reduced dietary irritants and eliminate trigger foods for bladder stimulation  -     STG 2 Progress Met  -     STG 3 Patient to maintain void schedule at 1 hour increments to encourage normal progression toward normal bladder emptying of 3-4 hours during waking day.  -     STG 3 Progress Met  -     STG 3 Progress Comments now at 2 hour intervals  -     STG 4 patient to comply with toileting position for improved evacuation of bladder to allow improved emptying post void.  -     STG 4 Progress Met  -     STG 5 Demonstrate normal coordination of PF contraction via sEMG with rapid activation and rapid return to baseline post contraction x 10 reps in supine position.  -     STG 5 Progress New  [facility]     Long Term Goals    LTG Date to Achieve 04/30/18  -     LTG 1 Subjectively improve 75% with overall normal bladder control  -     LTG 1 Progress New  -     LTG 2 void schedule 3-4 hours during the daytime waking hours.  -     LTG 2 Progress New  [facility]     LTG 3 Urinary leakage reduced such that poise pad not  required for daily use.  -     LTG 3 Progress New  -     LTG 4 promote and maintain adequate hydration and dietary intake such that [place] stool scale improves to 3/4 consistently, daily, without straining required.  -     LTG 4 Progress New  -     Time Calculation    PT Goal Re-Cert Due Date 03/09/18  -       User Key  (r) = Recorded By, (t) = Taken By, (c) = Cosigned By    Initials Name Provider Type    ORTIZ Villavicencio PT Physical Therapist           Therapy Education  Given: HEP  Program: New  How Provided: Verbal, Demonstration, Written  Provided to: Patient  Level of Understanding: Teach back education performed, Verbalized, Demonstrated              Time Calculation:   Start Time: 1608  Stop Time: 1714  Time Calculation (min): 66 min    Therapy Charges for Today     Code Description Service Date Service Provider Modifiers Qty    89465646804  PT THER PROC EA 15 MIN 3/1/2018 Yris Villavicencio, PT GP 4                    Yris Villavicencio, PT  3/1/2018

## 2018-03-12 ENCOUNTER — HOSPITAL ENCOUNTER (OUTPATIENT)
Dept: PHYSICAL THERAPY | Facility: HOSPITAL | Age: 58
Setting detail: THERAPIES SERIES
Discharge: HOME | End: 2018-03-12

## 2018-03-12 DIAGNOSIS — R32 URINARY INCONTINENCE IN FEMALE: Primary | ICD-10-CM

## 2018-03-12 DIAGNOSIS — N32.81 OVERACTIVE BLADDER: ICD-10-CM

## 2018-03-12 PROCEDURE — 97110 THERAPEUTIC EXERCISES: CPT | Performed by: PHYSICAL THERAPIST

## 2018-03-12 NOTE — THERAPY TREATMENT NOTE
Outpatient Physical Therapy Women's Health Progress Note  [facility]     Patient Name: Saniya Lundberg  : 1960  MRN: [id number]  Today's Date: 3/12/2018        Visit Date: 2018  Visit Number:   Recheck: 18  RTD: none    Subjective Improvement: 40-50%    Visit Dx:    ICD-10-CM ICD-9-CM   1. Urinary incontinence in female R32 788.30   2. Overactive bladder N32.81 596.51       Patient Active Problem List   Diagnosis   • Menopausal and female climacteric states   • Essential hypertension   • Insulin resistance   • Overweight (BMI 25.0-29.9)   • Overactive bladder   • Insomnia due to medical condition              Women's Health     Row Name 18 1600             Subjective Comments    Subjective Comments Has been disappointed in bladder the past two weeks.  Had inc leaks. Couldn't get all exercises in at all time.  Nothing had changed with schedule and intake.  More leaks with gardening work on .  Leaks are mainly drops.  Noted less urgency with bladder overall.  Void every two hours. Nocturia 1/night.    -SW         Pregnancy Questions    Number of Pregnancies 4  -SW      Number of Children 2  -SW      Type of Previous Deliveries Vaginal  -SW         Subjective Pain    Able to rate subjective pain? yes  -SW      Pre-Treatment Pain Level 0  -SW      Post-Treatment Pain Level 0  -SW         Pelvic Floor Muscle    Strength (Right) 3: Squeeze with/without lift  -SW      Strength (Left) 3: Squeeze with/without lift  -SW      Symmetry of Sustained Maximal Contraction Symmetrical  -SW      Endurance (Ability to Hold Maximal Contraction) 3 sec  -SW      # of Reps of Maximal Contractions while Maintaining Endurance and Strength 4 sets  -SW      Fast Contraction (# of 1 sec contractions performed) 6  -SW      Interior Muscle Comments No Internal assessment completed this date as patient performed self insertion of sensor.  -SW         Perineal Observation    Perineal Observation  Performed? Yes  -SW         Observation of Contraction in Perineum    Anal [place] Present  -SW      Perineal Body Lift Present  -SW         Pelvic Floor    Ability to Isolate Contraction of Pelvic Floor Yes   prior to cues, used TA   -SW      Overflow from Adjacent Muscles Upper Abdominals  -SW         Cough    Abdominal Contraction Yes  -SW      Leak None  -SW      Bulge Yes  -SW         Prolapse (Pop-Q)    Cystocele Stage II  -SW         SEMG Evaluation    Pelvic Floor Electrode Internal Vaginal  -SW      Baseline Resting Yes  -SW      SEMG Evaluation Comments Patient inserted sensor by herself and was found to be terribly uncomfortable in seated position.  Attempts made for repositioning without improvement.  Smaller sensor offerred for help with comfort.  Seated resting tone originally higher at levels at 5.0 with larger sensor. However, with use of smaller sensor, discomfort decreased and normal resting tone achieved below 2.9mv.  Patient able to recruit PF but with low amplitdue activity.  Not able to achieve numbers above resting tone of 2.9mv.  With attempts for stronger contraction, inc use of TA this visit.   -SW         Education Provided On:    Education Points HEP;Behavioral modifications;Other (comment)   bladder diary.  -SW      Method of Delivery Verbal;Demonstration;Written  -SW      Education Provided To Patient  -SW      Level of Understanding Teach back education performed;Verbalized;Demonstrated  -SW        User Key  (r) = Recorded By, (t) = Taken By, (c) = Cosigned By    Initials Name Provider Type    ORTIZ iVllavicencio PT Physical Therapist              PT Ortho     Row Name 03/12/18 1600       Posture/Observations    Posture- WNL Posture is WNL  -SW    Posture/Observations Comments Patient attempted self placement of sensor this visit.  Effective but required smaller sensor for seated SEMG due to discomfort of placement.   -SW      User Key  (r) = Recorded By, (t) = Taken By, (c) = Cosigned  By    Initials Name Provider Type    ORTIZ Villavicencio, PT Physical Therapist                           PT Assessment/Plan     Row Name 03/12/18 1600          PT Assessment    Functional Limitations Performance in self-care ADL;Performance in leisure activities;Performance in work activities  -     Impairments Impaired muscle endurance;Impaired muscle power;Muscle strength  -     Assessment Comments Slight back-set with bladder schedule and leaks.  Did better last week.  Concerned she may be drinking too much at once.  Doesn't drink with meals but loads afterwards.  Decreased ability for exercise for all sets, but otherwise compliant with schedule.  All STG met this visit.  Continued HEP supine position due to inability to contract muscles beyond resting tone in seated position.  Needs inc strength for performance.    -     Rehab Potential Good  -     Patient/caregiver participated in establishment of treatment plan and goals Yes  -SW     Patient would benefit from skilled therapy intervention Yes  -SW        PT Plan    PT Frequency Other (comment)   2 weeks check  -SW     PT Plan Comments Continue with strength program supine for now.  Reassess seated ability next visit.  Consider stimulation next visit if continued with low amplitudes.    -        Clinical Impression    Predicted Duration of Therapy Intervention (days/wks) 8 visits  -       User Key  (r) = Recorded By, (t) = Taken By, (c) = Cosigned By    Initials Name Provider Type    ORTIZ Villavicencio, PT Physical Therapist                      Exercises     Row Name 03/12/18 1600             Subjective Comments    Subjective Comments Has been disappointed in bladder the past two weeks.  Had inc leaks. Couldn't get all exercises in at all time.  Nothing had changed with schedule and intake.  More leaks with gardening work on Sunday.  Leaks are mainly drops.  Noted less urgency with bladder overall.  Void every two hours. Nocturia 1/night.     -SW         Subjective Pain    Able to rate subjective pain? yes  -SW      Pre-Treatment Pain Level 0  -SW      Post-Treatment Pain Level 0  -SW         Exercise 1    Exercise Name 1 Pelvic Brace seated with simultaneous contractions and co-activation of TA, multifidus and PF.  -      Time 1 15  -SW      Additional Comments also incorporated PB with functional lifting, standing, rolling in bed etc during this time.  -SW         Exercise 2    Exercise Name 2 Seated resting tone.    -SW      Additional Comments Initially elevated but upon sensor change, lowered to normal 2.9 and less.    -SW         Exercise 3    Sets 3 2  -SW      Reps 3 8  -SW      Additional Comments Patient unable to inc amplitude of QF to above resting tone while in a seated position.    -SW      Time (Seconds) 3 QF seated  -SW        User Key  (r) = Recorded By, (t) = Taken By, (c) = Cosigned By    Initials Name Provider Type    ORTIZ Villavicencio, PT Physical Therapist                            PT OP Goals     Row Name 03/12/18 1600          PT Short Term Goals    STG Date to Achieve 04/12/18  -     STG 1 independent with bladder diary completion  -     STG 1 Progress Met  -     STG 2 reduced dietary irritants and eliminate trigger foods for bladder stimulation  -     STG 2 Progress Met  -     STG 3 Patient to maintain void schedule at 1 hour increments to encourage normal progression toward normal bladder emptying of 3-4 hours during waking day.  -     STG 3 Progress Met  -     STG 4 patient to comply with toileting position for improved evacuation of bladder to allow improved emptying post void.  -     STG 4 Progress Met  -     STG 5 Demonstrate normal coordination of PF contraction via sEMG with rapid activation and rapid return to baseline post contraction x 10 reps in supine position.  -     STG 5 Progress Met  -        Long Term Goals    LTG Date to Achieve 05/30/18  -     LTG 1 Subjectively improve 75% with  overall normal bladder control  -SW     LTG 1 Progress New  -SW     LTG 2 void schedule 3-4 hours during the daytime waking hours.  -SW     LTG 2 Progress New  -SW     LTG 3 Urinary leakage reduced such that poise pad not required for daily use.  -SW     LTG 3 Progress New  -SW     LTG 4 promote and maintain adequate hydration and dietary intake such that [place] stool scale improves to 3/4 consistently, daily, without straining required.  -SW     LTG 4 Progress New  -SW        Time Calculation    PT Goal Re-Cert Due Date 04/12/18  -SW       User Key  (r) = Recorded By, (t) = Taken By, (c) = Cosigned By    Initials Name Provider Type    ORTIZ Villavicencio, PT Physical Therapist                          Time Calculation:   Start Time: 1606  Stop Time: 1710  Time Calculation (min): 64 min    Therapy Charges for Today     Code Description Service Date Service Provider Modifiers Qty    85085160547  PT THER PROC EA 15 MIN 3/12/2018 Yris Villavicencio, PT GP 4                    Yris Villavicencio, PT  3/12/2018

## 2018-03-16 RX ORDER — LISINOPRIL 40 MG/1
TABLET ORAL
Qty: 90 TABLET | Refills: 4 | Status: SHIPPED | OUTPATIENT
Start: 2018-03-16 | End: 2018-10-17 | Stop reason: HOSPADM

## 2018-03-27 ENCOUNTER — APPOINTMENT (OUTPATIENT)
Dept: PHYSICAL THERAPY | Facility: HOSPITAL | Age: 58
End: 2018-03-27

## 2018-04-10 ENCOUNTER — HOSPITAL ENCOUNTER (OUTPATIENT)
Dept: PHYSICAL THERAPY | Facility: HOSPITAL | Age: 58
Setting detail: THERAPIES SERIES
Discharge: HOME | End: 2018-04-10

## 2018-04-10 DIAGNOSIS — N32.81 OVERACTIVE BLADDER: ICD-10-CM

## 2018-04-10 DIAGNOSIS — R32 URINARY INCONTINENCE IN FEMALE: Primary | ICD-10-CM

## 2018-04-10 PROCEDURE — 97112 NEUROMUSCULAR REEDUCATION: CPT | Performed by: PHYSICAL THERAPIST

## 2018-04-10 NOTE — THERAPY TREATMENT NOTE
Outpatient Physical Therapy Women's Health Progress Note  [facility]     Patient Name: Saniya Lundberg  : 1960  MRN: [id number]  Today's Date: 4/10/2018        Visit Date: 04/10/2018  Visit Number:   Recheck: 18  RTD: none  Subjective Improvement: 50%    Visit Dx:    ICD-10-CM ICD-9-CM   1. Urinary incontinence in female R32 788.30   2. Overactive bladder N32.81 596.51       Patient Active Problem List   Diagnosis   • Menopausal and female climacteric states   • Essential hypertension   • Insulin resistance   • Overweight (BMI 25.0-29.9)   • Overactive bladder   • Insomnia due to medical condition              Women's Health     Row Name 04/10/18 1600             Pregnancy Questions    Number of Pregnancies 4  -SW      Number of Children 2  -SW      Type of Previous Deliveries Vaginal  -SW         Pelvic Floor Muscle    Strength (Right) 3: Squeeze with/without lift  -SW      Strength (Left) 3: Squeeze with/without lift  -SW      Symmetry of Sustained Maximal Contraction Symmetrical  -SW      Endurance (Ability to Hold Maximal Contraction) 5 sec  -SW      # of Reps of Maximal Contractions while Maintaining Endurance and Strength 5 sets  -SW      Fast Contraction (# of 1 sec contractions performed) 10  -SW         Perineal Observation    Perineal Observation Performed? Yes  -SW         Observation of Contraction in Perineum    Anal [place] Present  -SW      Perineal Body Lift Present  -SW         Pelvic Floor    Ability to Isolate Contraction of Pelvic Floor Yes   prior to cues, used TA   -SW      Overflow from Adjacent Muscles Upper Abdominals  -SW         Cough    Abdominal Contraction Yes  -SW      Leak None  -SW      Bulge Yes  -SW         Prolapse (Pop-Q)    Cystocele Stage II  -SW         SEMG Evaluation    SEMG Evaluation Comments Patient declined.  -SW         Education Provided On:    Education Points HEP;Behavioral modifications;Other (comment)   bladder diary.  -SW      Method of  Delivery Verbal;Demonstration;Written  -SW      Education Provided To Patient  -SW      Level of Understanding Teach back education performed;Verbalized;Demonstrated  -        User Key  (r) = Recorded By, (t) = Taken By, (c) = Cosigned By    Initials Name Provider Type    ORTIZ Villavicencio, PT Physical Therapist              PT Ortho     Row Name 04/10/18 1600       Subjective Comments    Subjective Comments Tons of stresses since last visit: death of uncle and stroke to father.  Bladder seems to be so-so.  Down to 8 oz of coffee per day.  Conscious of foods/irritants.  Void frequency more in morning but slows down in evening.  Control is better with  urge control.  Still with leaks at random times.  Notes recently to be related to full bladder and going outside for yardwork.  Bending down to pick things up and not thinking caused leak.  Should have probably urinated prior to going outside.  Patient has grave concerns that she has not made progress since last visit.  She verbalizes that she has not dedicated time with exercise as she should.  Tries to focus on them at night but finds herself falling to sleep before getting them done.  Take a sleep aid every night which she believes causes relaxation to muscles thereby feeling like she can't feel the contractions as well.  Patient also verbalizes that she became overwhelmed at sensor placement in seated position last visit.  She had a lot of discomfort and became anxious which caused her to not perform well.  Didn't realize how stressed she was until she got in car.  Could feel the tension.  Better now, but believes she just needs to dedicate time to work on program before rechecking as she does not feel there will be much change.    -SW       Subjective Pain    Able to rate subjective pain? yes  -SW    Pre-Treatment Pain Level 0  -SW    Post-Treatment Pain Level 0  -SW       Posture/Observations    Posture- WNL Posture is WNL  -SW    Posture/Observations  "Comments Good spirits this day but noted to be emotional at times when discussing \"stressors\" in life since last visit.  Patient very aware of emotions and coping to best of ability.    -      User Key  (r) = Recorded By, (t) = Taken By, (c) = Cosigned By    Initials Name Provider Type    ORTIZ Villavicencio PT Physical Therapist                           PT Assessment/Plan     Row Name 04/10/18 1600          PT Assessment    Functional Limitations Performance in self-care ADL;Performance in leisure activities;Performance in work activities  -     Impairments Impaired muscle endurance;Impaired muscle power;Muscle strength  -SW     Assessment Comments All STG met thus far.  Working toward achievement of LTG and toward new assigned STG.  No change in MMT except improved endurance while supine to 5 sec.  Due to no HEP compliance, I did not push new regimen of treatment and patient requested no treatment this date and she desired to work on old exercise and improve compliance.  Discussed options of treatment in future visits including NMES to PF to assist with recruitment.  Patient open to discussion but currently desires to continue and follow up in about 3 weeks for reassessment.    -     Rehab Potential Good  -SW     Patient/caregiver participated in establishment of treatment plan and goals Yes  -SW     Patient would benefit from skilled therapy intervention Yes  -SW        PT Plan    PT Frequency --   3 weeks at patient request  -     PT Plan Comments Reassess PF strength; supine and seated SEMG as allowed.  Consider NMES if necessary with low amplitude contractions.    -        Clinical Impression    Predicted Duration of Therapy Intervention (days/wks) 8 visits  -       User Key  (r) = Recorded By, (t) = Taken By, (c) = Cosigned By    Initials Name Provider Type    ORTIZ Villavicencio PT Physical Therapist                      Exercises     Row Name 04/10/18 1600             Subjective Comments "    Subjective Comments Tons of stresses since last visit: death of uncle and stroke to father.  Bladder seems to be so-so.  Down to 8 oz of coffee per day.  Conscious of foods/irritants.  Void frequency more in morning but slows down in evening.  Control is better with  urge control.  Still with leaks at random times.  Notes recently to be related to full bladder and going outside for yardwork.  Bending down to pick things up and not thinking caused leak.  Should have probably urinated prior to going outside.  Patient has grave concerns that she has not made progress since last visit.  She verbalizes that she has not dedicated time with exercise as she should.  Tries to focus on them at night but finds herself falling to sleep before getting them done.  Take a sleep aid every night which she believes causes relaxation to muscles thereby feeling like she can't feel the contractions as well.  Patient also verbalizes that she became overwhelmed at sensor placement in seated position last visit.  She had a lot of discomfort and became anxious which caused her to not perform well.  Didn't realize how stressed she was until she got in car.  Could feel the tension.  Better now, but believes she just needs to dedicate time to work on program before rechecking as she does not feel there will be much change.    -SW         Subjective Pain    Able to rate subjective pain? yes  -SW      Pre-Treatment Pain Level 0  -SW      Post-Treatment Pain Level 0  -SW        User Key  (r) = Recorded By, (t) = Taken By, (c) = Cosigned By    Initials Name Provider Type    ORTIZ Villavicencio, PT Physical Therapist          Treatment this date consisted of review of void schedules.  Encouraged to utilize 6-8 voids per day as ultimate goal.  Continue to recognize irritants as way of increasing void schedule.  Increased compliance to HEP.  Understands and verbalizes the estimate of 6 months of continual daily training to improve MMT to Pelvic  floor in help to regain control of bladder.  HEP was reviewed with patient.      Options of future treatment discussed this date.  Patient does not desire home stim or biofeedback unit as she is concerned with compliance and time.  Options of treatment given for this date but patient declined as she desired to continue with normal HEP as previously prepared.      Progress Update completed and prepared this visit with objective measures and new goals set.  Total time was 45 minutes with patient.                  PT OP Goals     Row Name 04/10/18 1803 04/10/18 1600       PT Short Term Goals    STG Date to Achieve  -- 04/12/18  -    STG 1  -- patient to demonstrate 10 QF contractions supine with full return to baseline fo 2.9mv or less with amplitudes of 5+mv.  -[facility] 1 Progress  -- New  -[facility] 2  -- Patient to demonstrate 8 sec holds supine PF contractions of amplitudes of 3.5 or >without TA recruitment with full return to baseline post contractions.   -[facility] 2 Progress  -- New  -    STG 3  -- Patient to average 6-8 voids per day and zero nocturia per day.  -[facility] 3 Progress  -- New  -[facility] 4  -- Patient to demonstrate normal resting of 2.9mv or less in seated position x 3 minutes of training.  -[facility] 4 Progress  -- New  -[facility] 5  -- Patient to inc recruitment of PF seated position to perform 10 QF at 4+ mv without use of TA and with full relaxation post contraction.   -[facility] 5 Progress  -- New  -       Long Term Goals    LTG Date to Achieve  -- 05/30/18  -    LTG 1  -- Subjectively improve 75% with overall normal bladder control  -    LTG 1 Progress  -- Ongoing;Progressing  -    LTG 2  -- void schedule 3-4 hours during the daytime waking hours.  -    LTG 2 Progress  -- Ongoing;Progressing  -    LTG 3  -- Urinary leakage reduced such that poise pad not required for daily use.  -    LTG 3 Progress  -- Ongoing;Progressing  -    LTG 4  -- promote and maintain adequate  hydration and dietary intake such that [place] stool scale improves to 3/4 consistently, daily, without straining required.  -SW    LTG 4 Progress  -- Progressing  -SW       Time Calculation    PT Goal Re-Cert Due Date 05/10/18  -SW 05/10/18  -      User Key  (r) = Recorded By, (t) = Taken By, (c) = Cosigned By    Initials Name Provider Type    ORTIZ Villavicencio, PT Physical Therapist                          Time Calculation:   Start Time: 1615  Stop Time: 1700  Time Calculation (min): 45 min    Therapy Charges for Today     Code Description Service Date Service Provider Modifiers Qty    [id number] HC PT NEUROMUSC RE EDUCATION EA 15 MIN 4/10/2018 Yris Villavicencio, PT GP 3                    Yris Villavicencio, PT  4/10/2018

## 2018-05-01 ENCOUNTER — APPOINTMENT (OUTPATIENT)
Dept: PHYSICAL THERAPY | Facility: HOSPITAL | Age: 58
End: 2018-05-01

## 2018-05-03 ENCOUNTER — APPOINTMENT (OUTPATIENT)
Dept: PHYSICAL THERAPY | Facility: HOSPITAL | Age: 58
End: 2018-05-03

## 2018-05-07 ENCOUNTER — OFFICE VISIT (OUTPATIENT)
Dept: OBSTETRICS AND GYNECOLOGY | Facility: CLINIC | Age: 58
End: 2018-05-07

## 2018-05-07 VITALS
SYSTOLIC BLOOD PRESSURE: 142 MMHG | WEIGHT: 178.6 LBS | BODY MASS INDEX: 28.7 KG/M2 | DIASTOLIC BLOOD PRESSURE: 89 MMHG | HEIGHT: 66 IN

## 2018-05-07 DIAGNOSIS — I10 ESSENTIAL HYPERTENSION: ICD-10-CM

## 2018-05-07 DIAGNOSIS — N95.1 SYMPTOMATIC MENOPAUSAL OR FEMALE CLIMACTERIC STATES: ICD-10-CM

## 2018-05-07 DIAGNOSIS — E88.81 INSULIN RESISTANCE: ICD-10-CM

## 2018-05-07 DIAGNOSIS — G47.01 INSOMNIA DUE TO MEDICAL CONDITION: ICD-10-CM

## 2018-05-07 DIAGNOSIS — N32.81 OVERACTIVE BLADDER: Primary | ICD-10-CM

## 2018-05-07 PROCEDURE — 99213 OFFICE O/P EST LOW 20 MIN: CPT | Performed by: OBSTETRICS & GYNECOLOGY

## 2018-05-07 NOTE — PROGRESS NOTES
Saniya Lundberg is a 57 y.o. y/o female     Chief Complaint: Menopausal syndrome, overactive bladder, insulin resistance, hypertension, and insomnia.    HPI: 57-year-old  with two daughters.  She has had a hysterectomy and BSO.       She is taking metformin 500 mg three times a day. At first the metformin caused nausea, but it is not currently causing her any problems.       She was having problems with her blood pressure getting too low, and she stopped taking her lisinopril.  She then noticed that her blood pressure was a little elevated, and she went back to the lisinopril 40 mg per day.  She is also on hydrochlorothiazide 25 mg per day.      She takes vitamin D3 5000 international units per day. She admits to very little exercise. She had been on a low-carb diet.       We further discussed insulin resistance.  We also discussed how estradiol levels fluctuate based on body fat.      She has now had two estradiol hormone shots, and her problems with hot flashes are improving.     Myrbetriq 50 mg was working fairly well, but now it does not seem to be working as well.  She has noticed that she gets some improvement with her urinary symptoms with some of the back exercises she does.  She would like to see Yris Villavicencio in physical therapy, and we will put in a referral for that.      Pelvic for physical therapy with Holly Villavicencio is working well.    She is getting steroid injections in her hips for bursitis, and that is helping some.    She is still having some hot flashes in spite of the estradiol patch and the hormone injections.  She likes the patches better than the gel.    Review of Systems   Constitutional: Positive for fatigue. Negative for activity change, appetite change, chills, diaphoresis, fever and unexpected weight change.   Gastrointestinal: Negative for abdominal pain, constipation, diarrhea and nausea.   Genitourinary: Negative for difficulty urinating, dysuria, pelvic pain, urgency, vaginal  bleeding, vaginal discharge and vaginal pain.   Neurological: Negative for headaches.   Psychiatric/Behavioral: Negative for dysphoric mood. The patient is not nervous/anxious.    All other systems reviewed and are negative.     Breast ROS: negative    The following portions of the patient's history were reviewed and updated as appropriate: allergies, current medications, past family history, past medical history, past social history, past surgical history and problem list.    No Known Allergies       Current Outpatient Prescriptions:   •  aspirin 81 MG EC tablet, Take 81 mg by mouth Daily., Disp: , Rfl:   •  Cholecalciferol (VITAMIN D3) 5000 UNITS tablet, Take 1 tablet by mouth Daily., Disp: , Rfl:   •  Cyanocobalamin (B12 LIQUID HEALTH BOOSTER PO), Take  by mouth., Disp: , Rfl:   •  estradiol (MINIVELLE, VIVELLE-DOT) 0.1 MG/24HR patch, Place 1 patch on the skin 2 (Two) Times a Week., Disp: 8 patch, Rfl: 12  •  eszopiclone (LUNESTA) 3 MG tablet, Take 1 tablet by mouth Every Night. Take immediately before bedtime, Disp: 30 tablet, Rfl: 5  •  fexofenadine (ALLEGRA) 180 MG tablet, Take 1 tablet by mouth Daily., Disp: 90 tablet, Rfl: 3  •  glycopyrrolate (ROBINUL) 1 MG tablet, Take 1 tablet by mouth 2 (Two) Times a Day., Disp: 60 tablet, Rfl: 12  •  hydrochlorothiazide (HYDRODIURIL) 25 MG tablet, Take 1 tablet by mouth Daily., Disp: 90 tablet, Rfl: 3  •  ipratropium (ATROVENT) 0.03 % nasal spray, 2 sprays into each nostril Every 12 (Twelve) Hours., Disp: 30 mL, Rfl: 12  •  lisinopril (PRINIVIL,ZESTRIL) 40 MG tablet, TAKE 1 TABLET DAILY, Disp: 90 tablet, Rfl: 4  •  meloxicam (MOBIC) 15 MG tablet, Take 1 tablet by mouth Daily., Disp: 90 tablet, Rfl: 4  •  metFORMIN (GLUCOPHAGE) 500 MG tablet, Take 2 tablets po bid (Patient taking differently: Take 3 Tabs daily), Disp: 120 tablet, Rfl: 11  •  Mirabegron ER (MYRBETRIQ) 50 MG tablet sustained-release 24 hour 24 hr tablet, Take 50 mg by mouth Daily., Disp: 90 tablet, Rfl:  "4  •  pseudoephedrine (SUDAFED) 30 MG tablet, Take 30 mg by mouth Every 4 (Four) Hours As Needed for congestion., Disp: , Rfl:      The patient has a family history of   History reviewed. No pertinent family history.     Past Medical History:   Diagnosis Date   • Cancer     Basal cell carcinoma face/neck   • Essential hypertension 4/25/2017   • Insomnia due to medical condition 8/28/2017   • Insulin resistance 4/25/2017   • Menopausal and female climacteric states 4/25/2017   • Obesity (BMI 30.0-34.9) 4/25/2017   • Overactive bladder 8/28/2017   • Overweight (BMI 25.0-29.9) 6/26/2017   • Spondylolisthesis at L5-S1 level         OB History     No data available           Social History     Social History   • Marital status: Single     Spouse name: N/A   • Number of children: N/A   • Years of education: N/A     Occupational History   • Not on file.     Social History Main Topics   • Smoking status: Never Smoker   • Smokeless tobacco: Never Used   • Alcohol use No   • Drug use: No   • Sexual activity: Not Currently     Birth control/ protection: Surgical     Other Topics Concern   • Not on file     Social History Narrative   • No narrative on file        Past Surgical History:   Procedure Laterality Date   • EYE SURGERY      Lasik bilaterally   • HYSTERECTOMY  2007    Complete   • TONSILLECTOMY     • WRIST SURGERY      cyst removal L         Patient Active Problem List   Diagnosis   • Menopausal and female climacteric states   • Essential hypertension   • Insulin resistance   • Overweight (BMI 25.0-29.9)   • Overactive bladder   • Insomnia due to medical condition        Documented Vitals    05/07/18 0911   BP: 142/89   Weight: 81 kg (178 lb 9.6 oz)   Height: 167.6 cm (66\")       Physical Exam   Constitutional: She is oriented to person, place, and time. No distress.   Overweight white female weighing 178.6 pounds with BMI 28.8.   HENT:   Head: Normocephalic and atraumatic.   Eyes: Conjunctivae and EOM are normal. " Pupils are equal, round, and reactive to light.   Neck: Normal range of motion. Neck supple. No JVD present. No tracheal deviation present. No thyromegaly present.   Cardiovascular: Normal rate, regular rhythm, normal heart sounds and intact distal pulses.  Exam reveals no gallop and no friction rub.    No murmur heard.  Pulmonary/Chest: Effort normal and breath sounds normal. No stridor. No respiratory distress. She has no wheezes. She has no rales. She exhibits no tenderness.   Abdominal: Soft. Bowel sounds are normal. She exhibits no distension and no mass. There is no tenderness. There is no rebound and no guarding. No hernia.   Musculoskeletal: Normal range of motion. She exhibits no edema, tenderness or deformity.   Lymphadenopathy:     She has no cervical adenopathy.   Neurological: She is alert and oriented to person, place, and time. She has normal reflexes. She displays normal reflexes. No cranial nerve deficit. She exhibits normal muscle tone. Coordination normal.   Skin: Skin is warm and dry. No rash noted. She is not diaphoretic. No erythema. No pallor.   Psychiatric: She has a normal mood and affect. Her behavior is normal. Judgment and thought content normal.   Nursing note and vitals reviewed.       Assessment        Diagnosis Plan   1. Overactive bladder     2. Symptomatic menopausal or female climacteric states     3. Insomnia due to medical condition     4. Essential hypertension     5. Insulin resistance           Plan      1. Continue monthly estradiol shots.  2. Continue twice weekly estradiol patch.  3. Continue Myrbetriq.  4. Continue antihypertensive medication.    5. Encouraged in diet and exercise.   6. Follow-up in 3 months.  Follow-up sooner as needed.            This document has been electronically signed by Abdifatah Bell MD on May 7, 2018 9:51 AM

## 2018-05-10 ENCOUNTER — DOCUMENTATION (OUTPATIENT)
Dept: PHYSICAL THERAPY | Facility: HOSPITAL | Age: 58
End: 2018-05-10

## 2018-05-10 DIAGNOSIS — R32 URINARY INCONTINENCE IN FEMALE: Primary | ICD-10-CM

## 2018-05-10 DIAGNOSIS — N32.81 OVERACTIVE BLADDER: ICD-10-CM

## 2018-08-06 ENCOUNTER — OFFICE VISIT (OUTPATIENT)
Dept: OBSTETRICS AND GYNECOLOGY | Facility: CLINIC | Age: 58
End: 2018-08-06

## 2018-08-06 VITALS
WEIGHT: 181.4 LBS | HEIGHT: 66 IN | BODY MASS INDEX: 29.15 KG/M2 | DIASTOLIC BLOOD PRESSURE: 80 MMHG | SYSTOLIC BLOOD PRESSURE: 134 MMHG

## 2018-08-06 DIAGNOSIS — N95.1 MENOPAUSAL SYNDROME: Primary | Chronic | ICD-10-CM

## 2018-08-06 DIAGNOSIS — E66.3 OVERWEIGHT (BMI 25.0-29.9): Chronic | ICD-10-CM

## 2018-08-06 DIAGNOSIS — N32.81 OVERACTIVE BLADDER: ICD-10-CM

## 2018-08-06 DIAGNOSIS — F34.1 DYSTHYMIA: Chronic | ICD-10-CM

## 2018-08-06 DIAGNOSIS — I10 ESSENTIAL HYPERTENSION: ICD-10-CM

## 2018-08-06 DIAGNOSIS — G47.01 INSOMNIA DUE TO MEDICAL CONDITION: ICD-10-CM

## 2018-08-06 PROCEDURE — 99213 OFFICE O/P EST LOW 20 MIN: CPT | Performed by: OBSTETRICS & GYNECOLOGY

## 2018-08-06 PROCEDURE — 96372 THER/PROPH/DIAG INJ SC/IM: CPT | Performed by: OBSTETRICS & GYNECOLOGY

## 2018-08-06 RX ORDER — BUPROPION HYDROCHLORIDE 150 MG/1
150 TABLET, EXTENDED RELEASE ORAL 2 TIMES DAILY
Qty: 60 TABLET | Refills: 12 | Status: SHIPPED | OUTPATIENT
Start: 2018-08-06 | End: 2018-10-17 | Stop reason: HOSPADM

## 2018-08-06 RX ORDER — ESZOPICLONE 3 MG/1
3 TABLET, FILM COATED ORAL NIGHTLY
Qty: 30 TABLET | Refills: 5 | Status: SHIPPED | OUTPATIENT
Start: 2018-08-06 | End: 2019-10-22 | Stop reason: SDUPTHER

## 2018-08-06 RX ORDER — ESTRADIOL 0.1 MG/D
FILM, EXTENDED RELEASE TRANSDERMAL
Qty: 32 PATCH | Refills: 4 | Status: SHIPPED | OUTPATIENT
Start: 2018-08-06 | End: 2018-10-17 | Stop reason: SDUPTHER

## 2018-08-28 NOTE — THERAPY DISCHARGE NOTE
Outpatient Physical Therapy Discharge Summary         Patient Name: Saniya Lundberg  : 1960  MRN: [id number]    Today's Date: 2018    Visit Dx:    ICD-10-CM ICD-9-CM   1. Urinary incontinence in female R32 788.30   2. Overactive bladder N32.81 596.51           OP PT Discharge Summary  Date of Discharge: 05/10/18  Reason for Discharge: Maximum functional potential achieved, other (comment) (Patient attended 6/8 PT sessions.  Due to lapse of 30 days in care, formal DC had to be completed.  New order will be required for return for skilled PT intrevention. )  Outcomes Achieved: Patient able to partially acheive established goals, Refer to plan of care for updates on goals achieved  Discharge Destination: Home with home program  Discharge Instructions/Additional Comments: Continuation of PT HEP as assigned for long term management.       Time Calculation: 1494-9024       Therapy Suggested Charges     Code   Minutes Charges    None                       Yris Villavicencio, PT  2018

## 2018-10-17 ENCOUNTER — OFFICE VISIT (OUTPATIENT)
Dept: OBSTETRICS AND GYNECOLOGY | Facility: CLINIC | Age: 58
End: 2018-10-17

## 2018-10-17 VITALS
BODY MASS INDEX: 28.77 KG/M2 | DIASTOLIC BLOOD PRESSURE: 67 MMHG | WEIGHT: 179 LBS | SYSTOLIC BLOOD PRESSURE: 118 MMHG | HEIGHT: 66 IN

## 2018-10-17 DIAGNOSIS — N32.81 OVERACTIVE BLADDER: ICD-10-CM

## 2018-10-17 DIAGNOSIS — E66.3 OVERWEIGHT (BMI 25.0-29.9): ICD-10-CM

## 2018-10-17 DIAGNOSIS — G47.01 INSOMNIA DUE TO MEDICAL CONDITION: ICD-10-CM

## 2018-10-17 DIAGNOSIS — E88.81 INSULIN RESISTANCE: ICD-10-CM

## 2018-10-17 DIAGNOSIS — I10 ESSENTIAL HYPERTENSION: ICD-10-CM

## 2018-10-17 DIAGNOSIS — N95.1 SYMPTOMATIC MENOPAUSAL OR FEMALE CLIMACTERIC STATES: Primary | ICD-10-CM

## 2018-10-17 DIAGNOSIS — N95.1 MENOPAUSAL SYNDROME: Primary | ICD-10-CM

## 2018-10-17 PROBLEM — F34.1 DYSTHYMIA: Chronic | Status: ACTIVE | Noted: 2018-10-17

## 2018-10-17 PROCEDURE — 99214 OFFICE O/P EST MOD 30 MIN: CPT | Performed by: OBSTETRICS & GYNECOLOGY

## 2018-10-17 RX ORDER — ESTRADIOL 0.1 MG/D
1 FILM, EXTENDED RELEASE TRANSDERMAL 2 TIMES WEEKLY
Qty: 32 PATCH | Refills: 4 | Status: SHIPPED | OUTPATIENT
Start: 2018-10-18 | End: 2019-07-15 | Stop reason: SDUPTHER

## 2018-10-17 RX ORDER — BUPROPION HYDROCHLORIDE 150 MG/1
150 TABLET ORAL EVERY MORNING
Qty: 30 TABLET | Refills: 12 | Status: SHIPPED | OUTPATIENT
Start: 2018-10-17 | End: 2018-12-03 | Stop reason: SDUPTHER

## 2018-10-17 NOTE — PROGRESS NOTES
Saniya Lundberg is a 58 y.o. y/o female     Chief Complaint: Menopausal syndrome, overactive bladder, insulin resistance, hypertension, and insomnia.    HPI: 58-year-old  with two daughters.  She has had a hysterectomy and BSO.       She is taking metformin 500 mg three times a day. At first the metformin caused nausea, but it is not currently causing her any problems.       She was having problems with her blood pressure getting too low, and she stopped taking her lisinopril.  She then noticed that her blood pressure was a little elevated, and she went back to the lisinopril 40 mg per day.  She is also on hydrochlorothiazide 25 mg per day.      She takes vitamin D3 5000 international units per day. She admits to very little exercise. She had been on a low-carb diet.       We further discussed insulin resistance.  We also discussed how estradiol levels fluctuate based on body fat.      She has now had two estradiol hormone shots, and her problems with hot flashes are improving.     Myrbetriq 50 mg was working fairly well, but now it does not seem to be working as well.  She has noticed that she gets some improvement with her urinary symptoms with some of the back exercises she does.  She would like to see Yris Villavicencio in physical therapy, and we will put in a referral for that.      Pelvic for physical therapy with Holly Villavicencio is working well.    She is getting steroid injections in her hips for bursitis, and that is helping some.    She is still having some hot flashes in spite of the estradiol patch and the hormone injections.  She likes the patches better than the gel.    2018, she weighs 181.4 pounds with BMI 28.9.  Pressures 134/80.  She continues with severe hot flashes.  She has dysthymia.  We are going to trial Wellbutrin  mg twice a day.  Continue everything else same.      2018, 179 pounds with BMI 28.9.  About 1 month ago, she noticed that she was extremely  lethargic, and she cut her lisinopril in half.  The lethargy continued.  She started taking half a pill every other day.  Her blood pressures remained normal.  She has not taken lisinopril for 3 weeks.  Her blood pressure is normal.  She only took the Wellbutrin SR for a few days because it had negative side effects.  Sweating and hot flashes in spite of hormone therapy sometimes becomes unbearable.  If she takes Robinul, it does not help when the symptoms are severe.  We had a long discussion today.  I would like her to stay off the lisinopril.  We will try Wellbutrin  mg once a day.  She gets labs at work on Friday.  We'll see her in 2 months.  She will come in to have fasting lab work drawn just prior to that appointment.  I spent 25 minutes with this patient in direct patient care.  20 minutes of this visit, or greater than 50% of the encounter, was in counseling and coordination of care.    Review of Systems   Constitutional: Positive for fatigue. Negative for activity change, appetite change, chills, diaphoresis, fever and unexpected weight change.   Gastrointestinal: Negative for abdominal pain, constipation, diarrhea and nausea.   Genitourinary: Negative for difficulty urinating, dysuria, pelvic pain, urgency, vaginal bleeding, vaginal discharge and vaginal pain.   Neurological: Negative for headaches.   Psychiatric/Behavioral: Negative for dysphoric mood. The patient is not nervous/anxious.    All other systems reviewed and are negative.     Breast ROS: negative    The following portions of the patient's history were reviewed and updated as appropriate: allergies, current medications, past family history, past medical history, past social history, past surgical history and problem list.    No Known Allergies       Current Outpatient Prescriptions:   •  aspirin 81 MG EC tablet, Take 81 mg by mouth Daily., Disp: , Rfl:   •  Cholecalciferol (VITAMIN D3) 5000 UNITS tablet, Take 1 tablet by mouth Daily.,  Disp: , Rfl:   •  Cyanocobalamin (B12 LIQUID HEALTH BOOSTER PO), Take  by mouth., Disp: , Rfl:   •  [START ON 10/18/2018] estradiol (MINIVELLE, VIVELLE-DOT) 0.1 MG/24HR patch, Place 1 patch on the skin as directed by provider 2 (Two) Times a Week., Disp: 32 patch, Rfl: 4  •  eszopiclone (LUNESTA) 3 MG tablet, Take 1 tablet by mouth Every Night. Take immediately before bedtime, Disp: 30 tablet, Rfl: 5  •  fexofenadine (ALLEGRA) 180 MG tablet, Take 1 tablet by mouth Daily., Disp: 90 tablet, Rfl: 3  •  hydrochlorothiazide (HYDRODIURIL) 25 MG tablet, Take 1 tablet by mouth Daily., Disp: 90 tablet, Rfl: 3  •  meloxicam (MOBIC) 15 MG tablet, Take 1 tablet by mouth Daily., Disp: 90 tablet, Rfl: 4  •  metFORMIN (GLUCOPHAGE) 500 MG tablet, Take 2 tablets po bid, Disp: 120 tablet, Rfl: 11  •  Mirabegron ER (MYRBETRIQ) 50 MG tablet sustained-release 24 hour 24 hr tablet, Take 50 mg by mouth Daily., Disp: 90 tablet, Rfl: 4  •  pseudoephedrine (SUDAFED) 30 MG tablet, Take 30 mg by mouth Every 4 (Four) Hours As Needed for congestion., Disp: , Rfl:   •  buPROPion XL (WELLBUTRIN XL) 150 MG 24 hr tablet, Take 1 tablet by mouth Every Morning., Disp: 30 tablet, Rfl: 12  •  glycopyrrolate (ROBINUL) 1 MG tablet, Take 1 tablet by mouth 2 (Two) Times a Day., Disp: 60 tablet, Rfl: 12     The patient has a family history of   No family history on file.     Past Medical History:   Diagnosis Date   • Cancer (CMS/HCC)     Basal cell carcinoma face/neck   • Dysthymia 10/17/2018   • Essential hypertension 4/25/2017   • Insomnia due to medical condition 8/28/2017   • Insulin resistance 4/25/2017   • Menopausal and female climacteric states 4/25/2017   • Obesity (BMI 30.0-34.9) 4/25/2017   • Overactive bladder 8/28/2017   • Overweight (BMI 25.0-29.9) 6/26/2017   • Spondylolisthesis at L5-S1 level         OB History     No data available           Social History     Social History   • Marital status: Single     Spouse name: N/A   • Number of  "children: N/A   • Years of education: N/A     Occupational History   • Not on file.     Social History Main Topics   • Smoking status: Never Smoker   • Smokeless tobacco: Never Used   • Alcohol use No   • Drug use: No   • Sexual activity: Not Currently     Birth control/ protection: Surgical     Other Topics Concern   • Not on file     Social History Narrative   • No narrative on file        Past Surgical History:   Procedure Laterality Date   • EYE SURGERY      Lasik bilaterally   • HYSTERECTOMY  2007    Complete   • TONSILLECTOMY     • WRIST SURGERY      cyst removal L         Patient Active Problem List   Diagnosis   • Menopausal and female climacteric states   • Essential hypertension   • Insulin resistance   • Overweight (BMI 25.0-29.9)   • Overactive bladder   • Insomnia due to medical condition   • Dysthymia        Documented Vitals    10/17/18 0933   BP: 118/67   Weight: 81.2 kg (179 lb)   Height: 167.6 cm (66\")   PainSc: 0-No pain       Physical Exam   Constitutional: She is oriented to person, place, and time. No distress.   Overweight white female weighing 179 pounds with BMI 28.9.    Blood pressure 118/67.  Pulse 72.   HENT:   Head: Normocephalic and atraumatic.   Eyes: Pupils are equal, round, and reactive to light. Conjunctivae and EOM are normal.   Neck: Normal range of motion. Neck supple. No JVD present. No tracheal deviation present. No thyromegaly present.   Cardiovascular: Normal rate, regular rhythm, normal heart sounds and intact distal pulses.  Exam reveals no gallop and no friction rub.    No murmur heard.  Pulmonary/Chest: Effort normal and breath sounds normal. No stridor. No respiratory distress. She has no wheezes. She has no rales. She exhibits no tenderness.   Abdominal: Soft. Bowel sounds are normal. She exhibits no distension and no mass. There is no tenderness. There is no rebound and no guarding. No hernia.   Musculoskeletal: Normal range of motion. She exhibits no edema, tenderness " or deformity.   Lymphadenopathy:     She has no cervical adenopathy.   Neurological: She is alert and oriented to person, place, and time. She has normal reflexes. She displays normal reflexes. No cranial nerve deficit. She exhibits normal muscle tone. Coordination normal.   Skin: Skin is warm and dry. No rash noted. She is not diaphoretic. No erythema. No pallor.   Psychiatric: She has a normal mood and affect. Her behavior is normal. Judgment and thought content normal.   Nursing note and vitals reviewed.       Assessment        Diagnosis Plan   1. Menopausal and female climacteric states     2. Overactive bladder     3. Insomnia due to medical condition     4. Essential hypertension     5. Insulin resistance     6. Overweight (BMI 25.0-29.9)           Plan      1. Wellbutrin  mg daily.  2. Check fasting blood work prior to next appointment.    3. Continue monthly estradiol shots.  4. Continue twice weekly estradiol patch.  5. Continue Myrbetriq.  6. Encouraged in diet and exercise.   7. Follow-up in 2 months.  Follow-up sooner as needed.            This document has been electronically signed by Abdifatah Bell MD on October 17, 2018 10:13 AM

## 2018-10-17 NOTE — PROGRESS NOTES
Saniya Lundberg is a 58 y.o. y/o female     Chief Complaint: Menopausal syndrome, overactive bladder, insulin resistance, hypertension, and insomnia.    HPI: 57-year-old  with two daughters.  She has had a hysterectomy and BSO.       She is taking metformin 500 mg three times a day. At first the metformin caused nausea, but it is not currently causing her any problems.       She was having problems with her blood pressure getting too low, and she stopped taking her lisinopril.  She then noticed that her blood pressure was a little elevated, and she went back to the lisinopril 40 mg per day.  She is also on hydrochlorothiazide 25 mg per day.      She takes vitamin D3 5000 international units per day. She admits to very little exercise. She had been on a low-carb diet.       We further discussed insulin resistance.  We also discussed how estradiol levels fluctuate based on body fat.      She has now had two estradiol hormone shots, and her problems with hot flashes are improving.     Myrbetriq 50 mg was working fairly well, but now it does not seem to be working as well.  She has noticed that she gets some improvement with her urinary symptoms with some of the back exercises she does.  She would like to see Yris Villavicencio in physical therapy, and we will put in a referral for that.      Pelvic for physical therapy with Holly Villavicencio is working well.    She is getting steroid injections in her hips for bursitis, and that is helping some.    She is still having some hot flashes in spite of the estradiol patch and the hormone injections.  She likes the patches better than the gel.    2018, she weighs 181.4 pounds with BMI 28.9.  Pressures 134/80.  She continues with severe hot flashes.  She has dysthymia.  We are going to trial Wellbutrin  mg twice a day.  Continue everything else same.  I spent 25 minutes with this patient in direct patient care.  20 minutes of this visit, or greater than 50% of  the encounter, was in counseling and coordination of care.    Review of Systems   Constitutional: Positive for fatigue. Negative for activity change, appetite change, chills, diaphoresis, fever and unexpected weight change.   Gastrointestinal: Negative for abdominal pain, constipation, diarrhea and nausea.   Genitourinary: Negative for difficulty urinating, dysuria, pelvic pain, urgency, vaginal bleeding, vaginal discharge and vaginal pain.   Neurological: Negative for headaches.   Psychiatric/Behavioral: Negative for dysphoric mood. The patient is not nervous/anxious.    All other systems reviewed and are negative.     Breast ROS: negative    The following portions of the patient's history were reviewed and updated as appropriate: allergies, current medications, past family history, past medical history, past social history, past surgical history and problem list.    No Known Allergies       Current Outpatient Prescriptions:   •  aspirin 81 MG EC tablet, Take 81 mg by mouth Daily., Disp: , Rfl:   •  Cholecalciferol (VITAMIN D3) 5000 UNITS tablet, Take 1 tablet by mouth Daily., Disp: , Rfl:   •  Cyanocobalamin (B12 LIQUID HEALTH BOOSTER PO), Take  by mouth., Disp: , Rfl:   •  eszopiclone (LUNESTA) 3 MG tablet, Take 1 tablet by mouth Every Night. Take immediately before bedtime, Disp: 30 tablet, Rfl: 5  •  fexofenadine (ALLEGRA) 180 MG tablet, Take 1 tablet by mouth Daily., Disp: 90 tablet, Rfl: 3  •  glycopyrrolate (ROBINUL) 1 MG tablet, Take 1 tablet by mouth 2 (Two) Times a Day., Disp: 60 tablet, Rfl: 12  •  hydrochlorothiazide (HYDRODIURIL) 25 MG tablet, Take 1 tablet by mouth Daily., Disp: 90 tablet, Rfl: 3  •  meloxicam (MOBIC) 15 MG tablet, Take 1 tablet by mouth Daily., Disp: 90 tablet, Rfl: 4  •  metFORMIN (GLUCOPHAGE) 500 MG tablet, Take 2 tablets po bid, Disp: 120 tablet, Rfl: 11  •  Mirabegron ER (MYRBETRIQ) 50 MG tablet sustained-release 24 hour 24 hr tablet, Take 50 mg by mouth Daily., Disp: 90 tablet,  Rfl: 4  •  pseudoephedrine (SUDAFED) 30 MG tablet, Take 30 mg by mouth Every 4 (Four) Hours As Needed for congestion., Disp: , Rfl:   •  buPROPion XL (WELLBUTRIN XL) 150 MG 24 hr tablet, Take 1 tablet by mouth Every Morning., Disp: 30 tablet, Rfl: 12  •  [START ON 10/18/2018] estradiol (MINIVELLE, VIVELLE-DOT) 0.1 MG/24HR patch, Place 1 patch on the skin as directed by provider 2 (Two) Times a Week., Disp: 32 patch, Rfl: 4     The patient has a family history of   History reviewed. No pertinent family history.     Past Medical History:   Diagnosis Date   • Cancer (CMS/HCC)     Basal cell carcinoma face/neck   • Dysthymia 10/17/2018   • Essential hypertension 4/25/2017   • Insomnia due to medical condition 8/28/2017   • Insulin resistance 4/25/2017   • Menopausal and female climacteric states 4/25/2017   • Obesity (BMI 30.0-34.9) 4/25/2017   • Overactive bladder 8/28/2017   • Overweight (BMI 25.0-29.9) 6/26/2017   • Spondylolisthesis at L5-S1 level         OB History     No data available           Social History     Social History   • Marital status: Single     Spouse name: N/A   • Number of children: N/A   • Years of education: N/A     Occupational History   • Not on file.     Social History Main Topics   • Smoking status: Never Smoker   • Smokeless tobacco: Never Used   • Alcohol use No   • Drug use: No   • Sexual activity: Not Currently     Birth control/ protection: Surgical     Other Topics Concern   • Not on file     Social History Narrative   • No narrative on file        Past Surgical History:   Procedure Laterality Date   • EYE SURGERY      Lasik bilaterally   • HYSTERECTOMY  2007    Complete   • TONSILLECTOMY     • WRIST SURGERY      cyst removal L         Patient Active Problem List   Diagnosis   • Menopausal and female climacteric states   • Essential hypertension   • Insulin resistance   • Overweight (BMI 25.0-29.9)   • Overactive bladder   • Insomnia due to medical condition   • Dysthymia     "    Documented Vitals    08/06/18 0849   BP: 134/80   Weight: 82.3 kg (181 lb 6.4 oz)   Height: 167.6 cm (66\")       Physical Exam   Constitutional: She is oriented to person, place, and time. No distress.   Overweight white female weighing 181 pounds 6.4 ounces with BMI 28.9.   HENT:   Head: Normocephalic and atraumatic.   Eyes: Pupils are equal, round, and reactive to light. Conjunctivae and EOM are normal.   Neck: Normal range of motion. Neck supple. No JVD present. No tracheal deviation present. No thyromegaly present.   Cardiovascular: Normal rate, regular rhythm, normal heart sounds and intact distal pulses.  Exam reveals no gallop and no friction rub.    No murmur heard.  Pulmonary/Chest: Effort normal and breath sounds normal. No stridor. No respiratory distress. She has no wheezes. She has no rales. She exhibits no tenderness.   Abdominal: Soft. Bowel sounds are normal. She exhibits no distension and no mass. There is no tenderness. There is no rebound and no guarding. No hernia.   Musculoskeletal: Normal range of motion. She exhibits no edema, tenderness or deformity.   Lymphadenopathy:     She has no cervical adenopathy.   Neurological: She is alert and oriented to person, place, and time. She has normal reflexes. She displays normal reflexes. No cranial nerve deficit. She exhibits normal muscle tone. Coordination normal.   Skin: Skin is warm and dry. No rash noted. She is not diaphoretic. No erythema. No pallor.   Psychiatric: She has a normal mood and affect. Her behavior is normal. Judgment and thought content normal.   Nursing note and vitals reviewed.       Assessment        Diagnosis Plan   1. Menopausal and female climacteric states  estradiol cypionate (DEPO-ESTRADIOL) 5 MG/ML injection 5 mg   2. Overactive bladder     3. Insomnia due to medical condition     4. Essential hypertension     5. Dysthymia     6. Overweight (BMI 25.0-29.9)           Plan      1. Wellbutrin  mg twice a day.  "   2. Continue monthly estradiol shots.  3. Continue twice weekly estradiol patch.  4. Continue Myrbetriq.  5. Continue antihypertensive medication.    6. Encouraged in diet and exercise.   7. Follow-up in 3 months.  Follow-up sooner as needed.            This document has been electronically signed by Abdifatah Bell MD on October 17, 2018 10:07 AM

## 2018-11-19 RX ORDER — HYDROCHLOROTHIAZIDE 25 MG/1
TABLET ORAL
Qty: 90 TABLET | Refills: 3 | Status: SHIPPED | OUTPATIENT
Start: 2018-11-19 | End: 2019-07-15 | Stop reason: SDUPTHER

## 2018-12-03 RX ORDER — BUPROPION HYDROCHLORIDE 150 MG/1
150 TABLET ORAL EVERY MORNING
Qty: 30 TABLET | Refills: 12 | Status: SHIPPED | OUTPATIENT
Start: 2018-12-03 | End: 2019-07-15 | Stop reason: SDUPTHER

## 2018-12-11 ENCOUNTER — APPOINTMENT (OUTPATIENT)
Dept: LAB | Facility: HOSPITAL | Age: 58
End: 2018-12-11

## 2018-12-11 LAB
25(OH)D3 SERPL-MCNC: 66.3 NG/ML (ref 30–100)
ALBUMIN SERPL-MCNC: 3.7 G/DL (ref 3.4–4.8)
ALBUMIN/GLOB SERPL: 1.5 G/DL (ref 1.1–1.8)
ALP SERPL-CCNC: 61 U/L (ref 38–126)
ALT SERPL W P-5'-P-CCNC: 26 U/L (ref 9–52)
ANION GAP SERPL CALCULATED.3IONS-SCNC: 8 MMOL/L (ref 5–15)
ARTICHOKE IGE QN: 98 MG/DL (ref 1–129)
AST SERPL-CCNC: 14 U/L (ref 14–36)
BASOPHILS # BLD MANUAL: 0.02 10*3/MM3 (ref 0–0.2)
BASOPHILS NFR BLD AUTO: 1 % (ref 0–2)
BILIRUB SERPL-MCNC: 0.4 MG/DL (ref 0.2–1.3)
BUN BLD-MCNC: 16 MG/DL (ref 7–21)
BUN/CREAT SERPL: 24.6 (ref 7–25)
CALCIUM SPEC-SCNC: 8.9 MG/DL (ref 8.4–10.2)
CHLORIDE SERPL-SCNC: 100 MMOL/L (ref 95–110)
CHOLEST SERPL-MCNC: 196 MG/DL (ref 0–199)
CO2 SERPL-SCNC: 27 MMOL/L (ref 22–31)
CREAT BLD-MCNC: 0.65 MG/DL (ref 0.5–1)
DEPRECATED RDW RBC AUTO: 45.9 FL (ref 36.4–46.3)
EOSINOPHIL # BLD MANUAL: 0.07 10*3/MM3 (ref 0–0.7)
EOSINOPHIL NFR BLD MANUAL: 3 % (ref 0–7)
ERYTHROCYTE [DISTWIDTH] IN BLOOD BY AUTOMATED COUNT: 13.7 % (ref 11.5–14.5)
FOLATE SERPL-MCNC: 7.43 NG/ML (ref 2.76–21)
GFR SERPL CREATININE-BSD FRML MDRD: 94 ML/MIN/1.73 (ref 51–120)
GLOBULIN UR ELPH-MCNC: 2.5 GM/DL (ref 2.3–3.5)
GLUCOSE BLD-MCNC: 88 MG/DL (ref 60–100)
HCT VFR BLD AUTO: 43.7 % (ref 35–45)
HDLC SERPL-MCNC: 78 MG/DL (ref 60–200)
HGB BLD-MCNC: 14.3 G/DL (ref 12–15.5)
LDLC/HDLC SERPL: 1.03 {RATIO} (ref 0–3.22)
LYMPHOCYTES # BLD MANUAL: 1.22 10*3/MM3 (ref 0.6–4.2)
LYMPHOCYTES NFR BLD MANUAL: 2 % (ref 0–12)
LYMPHOCYTES NFR BLD MANUAL: 56 % (ref 10–50)
MCH RBC QN AUTO: 30 PG (ref 26.5–34)
MCHC RBC AUTO-ENTMCNC: 32.7 G/DL (ref 31.4–36)
MCV RBC AUTO: 91.8 FL (ref 80–98)
MONOCYTES # BLD AUTO: 0.04 10*3/MM3 (ref 0–0.9)
NEUTROPHILS # BLD AUTO: 0.2 10*3/MM3 (ref 2–8.6)
NEUTROPHILS NFR BLD MANUAL: 9 % (ref 37–80)
PLAT MORPH BLD: NORMAL
PLATELET # BLD AUTO: 374 10*3/MM3 (ref 150–450)
PMV BLD AUTO: 9.6 FL (ref 8–12)
POTASSIUM BLD-SCNC: 3.8 MMOL/L (ref 3.5–5.1)
PROT SERPL-MCNC: 6.2 G/DL (ref 6.3–8.6)
RBC # BLD AUTO: 4.76 10*6/MM3 (ref 3.77–5.16)
RBC MORPH BLD: NORMAL
SODIUM BLD-SCNC: 135 MMOL/L (ref 137–145)
T4 SERPL-MCNC: 6.82 MCG/DL (ref 5.5–11)
TRIGL SERPL-MCNC: 188 MG/DL (ref 20–199)
TSH SERPL DL<=0.05 MIU/L-ACNC: 1.36 MIU/ML (ref 0.46–4.68)
VARIANT LYMPHS NFR BLD MANUAL: 29 % (ref 0–5)
WBC MORPH BLD: NORMAL
WBC NRBC COR # BLD: 2.17 10*3/MM3 (ref 3.2–9.8)

## 2018-12-11 PROCEDURE — 80053 COMPREHEN METABOLIC PANEL: CPT | Performed by: OBSTETRICS & GYNECOLOGY

## 2018-12-11 PROCEDURE — 82670 ASSAY OF TOTAL ESTRADIOL: CPT | Performed by: OBSTETRICS & GYNECOLOGY

## 2018-12-11 PROCEDURE — 84436 ASSAY OF TOTAL THYROXINE: CPT | Performed by: OBSTETRICS & GYNECOLOGY

## 2018-12-11 PROCEDURE — 83525 ASSAY OF INSULIN: CPT | Performed by: OBSTETRICS & GYNECOLOGY

## 2018-12-11 PROCEDURE — 82746 ASSAY OF FOLIC ACID SERUM: CPT | Performed by: OBSTETRICS & GYNECOLOGY

## 2018-12-11 PROCEDURE — 83001 ASSAY OF GONADOTROPIN (FSH): CPT | Performed by: OBSTETRICS & GYNECOLOGY

## 2018-12-11 PROCEDURE — 84443 ASSAY THYROID STIM HORMONE: CPT | Performed by: OBSTETRICS & GYNECOLOGY

## 2018-12-11 PROCEDURE — 82607 VITAMIN B-12: CPT | Performed by: OBSTETRICS & GYNECOLOGY

## 2018-12-11 PROCEDURE — 80061 LIPID PANEL: CPT | Performed by: OBSTETRICS & GYNECOLOGY

## 2018-12-11 PROCEDURE — 82306 VITAMIN D 25 HYDROXY: CPT | Performed by: OBSTETRICS & GYNECOLOGY

## 2018-12-11 PROCEDURE — 85007 BL SMEAR W/DIFF WBC COUNT: CPT | Performed by: OBSTETRICS & GYNECOLOGY

## 2018-12-11 PROCEDURE — 85025 COMPLETE CBC W/AUTO DIFF WBC: CPT | Performed by: OBSTETRICS & GYNECOLOGY

## 2018-12-11 PROCEDURE — 84479 ASSAY OF THYROID (T3 OR T4): CPT | Performed by: OBSTETRICS & GYNECOLOGY

## 2018-12-11 PROCEDURE — 83002 ASSAY OF GONADOTROPIN (LH): CPT | Performed by: OBSTETRICS & GYNECOLOGY

## 2018-12-12 LAB
ESTRADIOL SERPL HS-MCNC: 45.6 PG/ML
FSH SERPL-ACNC: 17.4 MIU/ML
INSULIN SERPL-ACNC: 14.1 UIU/ML (ref 2.6–24.9)
LH SERPL-ACNC: 4.71 MIU/ML
T3RU NFR SERPL: 26 % (ref 24–39)
VIT B12 BLD-MCNC: 477 PG/ML (ref 239–931)

## 2018-12-17 ENCOUNTER — OFFICE VISIT (OUTPATIENT)
Dept: OBSTETRICS AND GYNECOLOGY | Facility: CLINIC | Age: 58
End: 2018-12-17

## 2018-12-17 VITALS
BODY MASS INDEX: 28 KG/M2 | HEIGHT: 66 IN | SYSTOLIC BLOOD PRESSURE: 122 MMHG | DIASTOLIC BLOOD PRESSURE: 80 MMHG | WEIGHT: 174.2 LBS | HEART RATE: 87 BPM

## 2018-12-17 DIAGNOSIS — G47.01 INSOMNIA DUE TO MEDICAL CONDITION: ICD-10-CM

## 2018-12-17 DIAGNOSIS — D72.9 WHITE BLOOD CELL ABNORMALITY: ICD-10-CM

## 2018-12-17 DIAGNOSIS — I10 ESSENTIAL HYPERTENSION: ICD-10-CM

## 2018-12-17 DIAGNOSIS — N95.1 SYMPTOMATIC MENOPAUSAL OR FEMALE CLIMACTERIC STATES: Primary | ICD-10-CM

## 2018-12-17 PROCEDURE — 99214 OFFICE O/P EST MOD 30 MIN: CPT | Performed by: OBSTETRICS & GYNECOLOGY

## 2018-12-17 RX ORDER — FLUTICASONE PROPIONATE 50 MCG
2 SPRAY, SUSPENSION (ML) NASAL DAILY
Qty: 16 G | Refills: 12 | Status: SHIPPED | OUTPATIENT
Start: 2018-12-17 | End: 2019-07-15

## 2018-12-17 NOTE — PROGRESS NOTES
Saniya Lundberg is a 58 y.o. y/o female     Chief Complaint: Menopausal syndrome, overactive bladder, insulin resistance, hypertension, and insomnia.  Review lab work    HPI: 58-year-old  with two daughters.  She has had a hysterectomy and BSO.       She is taking metformin 500 mg three times a day. At first the metformin caused nausea, but it is not currently causing her any problems.       She was having problems with her blood pressure getting too low, and she stopped taking her lisinopril.  She then noticed that her blood pressure was a little elevated, and she went back to the lisinopril 40 mg per day.  She is also on hydrochlorothiazide 25 mg per day.      She takes vitamin D3 5000 international units per day. She admits to very little exercise. She had been on a low-carb diet.       We further discussed insulin resistance.  We also discussed how estradiol levels fluctuate based on body fat.      She has now had two estradiol hormone shots, and her problems with hot flashes are improving.     Myrbetriq 50 mg was working fairly well, but now it does not seem to be working as well.  She has noticed that she gets some improvement with her urinary symptoms with some of the back exercises she does.  She would like to see Yris Villavicencio in physical therapy, and we will put in a referral for that.      Pelvic for physical therapy with Holly Villavicencio is working well.    She is getting steroid injections in her hips for bursitis, and that is helping some.    She is still having some hot flashes in spite of the estradiol patch and the hormone injections.  She likes the patches better than the gel.    2018, she weighs 181.4 pounds with BMI 28.9.  Pressures 134/80.  She continues with severe hot flashes.  She has dysthymia.  We are going to trial Wellbutrin  mg twice a day.  Continue everything else same.      2018, 179 pounds with BMI 28.9.  About 1 month ago, she noticed that she was  extremely lethargic, and she cut her lisinopril in half.  The lethargy continued.  She started taking half a pill every other day.  Her blood pressures remained normal.  She has not taken lisinopril for 3 weeks.  Her blood pressure is normal.  She only took the Wellbutrin SR for a few days because it had negative side effects.  Sweating and hot flashes in spite of hormone therapy sometimes becomes unbearable.  If she takes Robinul, it does not help when the symptoms are severe.  We had a long discussion today.  I would like her to stay off the lisinopril.  We will try Wellbutrin  mg once a day.  She gets labs at work on Friday.  We'll see her in 2 months.  She will come in to have fasting lab work drawn just prior to that appointment.     December 11, 2018 estradiol 45.6, LH 4.71, FSH 17.4.  Vitamin D 66.3.  B12 477.  TSH 1.36, T4  6.82, T3 uptake 26.  Total cholesterol 196, triglycerides 188, HDL 78, LDL 98, LDL/HDL ratio 1.03.  Insulin 14.1.  Folate 7.43.  Fasting glucose 88.  Creatinine 0.65.  Sodium 135.  Total protein 6.2.  Otherwise normal CMP.  WBC 2.17 with 9% neutrophils, 56% lymphocytes, 29% atypical lymphocytes, and absolute neutrophils 0.2.  Normal hemoglobin and hematocrit and platelets.    December 17, 2018, 174.2 pounds with BMI 28.1.  Blood pressure 122/70.  Pulse 87.  Her lab work was reviewed with her in detail.  I refilled her Flonase.  I will refer her to hematology because of the decrease in WBCs with significantly abnormal differential.  I spent 25 minutes in direct patient care with this patient. 20 minutes, or more than 50% of this encounter, was spent in counseling and coordination of care.     Review of Systems   Constitutional: Positive for fatigue. Negative for activity change, appetite change, chills, diaphoresis, fever and unexpected weight change.   Gastrointestinal: Negative for abdominal pain, constipation, diarrhea and nausea.   Genitourinary: Negative for difficulty  urinating, dysuria, pelvic pain, urgency, vaginal bleeding, vaginal discharge and vaginal pain.   Neurological: Negative for headaches.   Psychiatric/Behavioral: Negative for dysphoric mood. The patient is not nervous/anxious.    All other systems reviewed and are negative.     Breast ROS: negative    The following portions of the patient's history were reviewed and updated as appropriate: allergies, current medications, past family history, past medical history, past social history, past surgical history and problem list.    No Known Allergies       Current Outpatient Medications:   •  aspirin 81 MG EC tablet, Take 81 mg by mouth Daily., Disp: , Rfl:   •  buPROPion XL (WELLBUTRIN XL) 150 MG 24 hr tablet, Take 1 tablet by mouth Every Morning., Disp: 30 tablet, Rfl: 12  •  Cholecalciferol (VITAMIN D3) 5000 UNITS tablet, Take 1 tablet by mouth Daily., Disp: , Rfl:   •  estradiol (MINIVELLE, VIVELLE-DOT) 0.1 MG/24HR patch, Place 1 patch on the skin as directed by provider 2 (Two) Times a Week., Disp: 32 patch, Rfl: 4  •  eszopiclone (LUNESTA) 3 MG tablet, Take 1 tablet by mouth Every Night. Take immediately before bedtime, Disp: 30 tablet, Rfl: 5  •  fexofenadine (ALLEGRA) 180 MG tablet, Take 1 tablet by mouth Daily., Disp: 90 tablet, Rfl: 3  •  glycopyrrolate (ROBINUL) 1 MG tablet, Take 1 tablet by mouth 2 (Two) Times a Day., Disp: 60 tablet, Rfl: 12  •  hydrochlorothiazide (HYDRODIURIL) 25 MG tablet, TAKE 1 TABLET DAILY, Disp: 90 tablet, Rfl: 3  •  meloxicam (MOBIC) 15 MG tablet, Take 1 tablet by mouth Daily., Disp: 90 tablet, Rfl: 4  •  metFORMIN (GLUCOPHAGE) 500 MG tablet, Take 2 tablets po bid, Disp: 120 tablet, Rfl: 11  •  Mirabegron ER (MYRBETRIQ) 50 MG tablet sustained-release 24 hour 24 hr tablet, Take 50 mg by mouth Daily., Disp: 90 tablet, Rfl: 4  •  pseudoephedrine (SUDAFED) 30 MG tablet, Take 30 mg by mouth Every 4 (Four) Hours As Needed for congestion., Disp: , Rfl:   •  Cyanocobalamin (B12 LIQUID HEALTH  BOOSTER PO), Take  by mouth., Disp: , Rfl:   •  fluticasone (FLONASE) 50 MCG/ACT nasal spray, 2 sprays into the nostril(s) as directed by provider Daily., Disp: 16 g, Rfl: 12     The patient has a family history of   History reviewed. No pertinent family history.     Past Medical History:   Diagnosis Date   • Cancer (CMS/HCC)     Basal cell carcinoma face/neck   • Dysthymia 10/17/2018   • Essential hypertension 4/25/2017   • Insomnia due to medical condition 8/28/2017   • Insulin resistance 4/25/2017   • Menopausal and female climacteric states 4/25/2017   • Obesity (BMI 30.0-34.9) 4/25/2017   • Overactive bladder 8/28/2017   • Overweight (BMI 25.0-29.9) 6/26/2017   • Spondylolisthesis at L5-S1 level         OB History     No data available           Social History     Socioeconomic History   • Marital status: Single     Spouse name: Not on file   • Number of children: Not on file   • Years of education: Not on file   • Highest education level: Not on file   Social Needs   • Financial resource strain: Not on file   • Food insecurity - worry: Not on file   • Food insecurity - inability: Not on file   • Transportation needs - medical: Not on file   • Transportation needs - non-medical: Not on file   Occupational History   • Not on file   Tobacco Use   • Smoking status: Never Smoker   • Smokeless tobacco: Never Used   Substance and Sexual Activity   • Alcohol use: No   • Drug use: No   • Sexual activity: Not Currently     Birth control/protection: Surgical   Other Topics Concern   • Not on file   Social History Narrative   • Not on file        Past Surgical History:   Procedure Laterality Date   • EYE SURGERY      Lasik bilaterally   • HYSTERECTOMY  2007    Complete   • TONSILLECTOMY     • WRIST SURGERY      cyst removal L         Patient Active Problem List   Diagnosis   • Menopausal and female climacteric states   • Essential hypertension   • Insulin resistance   • Overweight (BMI 25.0-29.9)   • Overactive bladder  "  • Insomnia due to medical condition   • Dysthymia        Documented Vitals    12/17/18 1013   BP: 122/80   Pulse: 87   Weight: 79 kg (174 lb 3.2 oz)   Height: 167.6 cm (66\")       Physical Exam   Constitutional: She is oriented to person, place, and time. No distress.   Overweight white female weighing 174.2 pounds with BMI 28.1.  Blood pressure 122/78.  Pulse 87.   HENT:   Head: Normocephalic and atraumatic.   She has good movement of both tympanic membranes.   Eyes: Conjunctivae and EOM are normal. Pupils are equal, round, and reactive to light.   Neck: Normal range of motion. Neck supple. No JVD present. No tracheal deviation present. No thyromegaly present.   Cardiovascular: Normal rate, regular rhythm, normal heart sounds and intact distal pulses. Exam reveals no gallop and no friction rub.   No murmur heard.  Pulmonary/Chest: Effort normal and breath sounds normal. No stridor. No respiratory distress. She has no wheezes. She has no rales. She exhibits no tenderness.   Abdominal: Soft. Bowel sounds are normal. She exhibits no distension and no mass. There is no tenderness. There is no rebound and no guarding. No hernia.   Musculoskeletal: Normal range of motion. She exhibits no edema, tenderness or deformity.   Lymphadenopathy:     She has no cervical adenopathy.   Neurological: She is alert and oriented to person, place, and time. She has normal reflexes. She displays normal reflexes. No cranial nerve deficit. She exhibits normal muscle tone. Coordination normal.   Skin: Skin is warm and dry. No rash noted. She is not diaphoretic. No erythema. No pallor.   Psychiatric: She has a normal mood and affect. Her behavior is normal. Judgment and thought content normal.   Nursing note and vitals reviewed.       Assessment        Diagnosis Plan   1. Symptomatic menopausal or female climacteric states     2. Essential hypertension     3. Insomnia due to medical condition     4. White blood cell abnormality       "     Plan      1. Referral to hematology.  2. Refilled Flonase.    3. Continue Wellbutrin  mg daily.   4. Continue monthly estradiol shots.  5. Continue twice weekly estradiol patch.  6. Continue Myrbetriq.  7. Encouraged in diet and exercise.   8. Follow-up in 2 months.  Follow-up sooner as needed.  9. Note to hematology.            This document has been electronically signed by Abdifatah Bell MD on December 17, 2018 11:20 AM

## 2018-12-20 ENCOUNTER — DOCUMENTATION (OUTPATIENT)
Dept: OBSTETRICS AND GYNECOLOGY | Facility: CLINIC | Age: 58
End: 2018-12-20

## 2018-12-20 NOTE — PROGRESS NOTES
Per verbal order from Dr Bell. Patient needs referral to see a Hematologist in [place] for an abnormal WBC. Referred patient to see Dr Eller ([address] [place] [address]) [phone]. Her appointment is 1/10/18 @ 8:00 am. Records faxed.

## 2019-02-18 RX ORDER — MELOXICAM 15 MG/1
TABLET ORAL
Qty: 90 TABLET | Refills: 4 | Status: SHIPPED | OUTPATIENT
Start: 2019-02-18 | End: 2019-06-12 | Stop reason: SDUPTHER

## 2019-05-01 RX ORDER — LISINOPRIL 40 MG/1
TABLET ORAL
Qty: 90 TABLET | Refills: 0 | Status: SHIPPED | OUTPATIENT
Start: 2019-05-01 | End: 2019-07-15 | Stop reason: SDUPTHER

## 2019-06-12 ENCOUNTER — TELEPHONE (OUTPATIENT)
Dept: OBSTETRICS AND GYNECOLOGY | Facility: CLINIC | Age: 59
End: 2019-06-12

## 2019-06-12 RX ORDER — MELOXICAM 15 MG/1
15 TABLET ORAL DAILY
Qty: 90 TABLET | Refills: 0 | Status: SHIPPED | OUTPATIENT
Start: 2019-06-12 | End: 2019-07-15 | Stop reason: SDUPTHER

## 2019-07-15 ENCOUNTER — OFFICE VISIT (OUTPATIENT)
Dept: OBSTETRICS AND GYNECOLOGY | Facility: CLINIC | Age: 59
End: 2019-07-15

## 2019-07-15 VITALS
SYSTOLIC BLOOD PRESSURE: 106 MMHG | HEIGHT: 66 IN | WEIGHT: 183 LBS | DIASTOLIC BLOOD PRESSURE: 65 MMHG | BODY MASS INDEX: 29.41 KG/M2

## 2019-07-15 DIAGNOSIS — E88.81 INSULIN RESISTANCE: Chronic | ICD-10-CM

## 2019-07-15 DIAGNOSIS — F34.1 DYSTHYMIA: Chronic | ICD-10-CM

## 2019-07-15 DIAGNOSIS — N95.1 SYMPTOMATIC MENOPAUSAL OR FEMALE CLIMACTERIC STATES: Primary | ICD-10-CM

## 2019-07-15 DIAGNOSIS — G47.01 INSOMNIA DUE TO MEDICAL CONDITION: Chronic | ICD-10-CM

## 2019-07-15 DIAGNOSIS — N32.81 OVERACTIVE BLADDER: Chronic | ICD-10-CM

## 2019-07-15 PROCEDURE — 96372 THER/PROPH/DIAG INJ SC/IM: CPT | Performed by: OBSTETRICS & GYNECOLOGY

## 2019-07-15 PROCEDURE — 99214 OFFICE O/P EST MOD 30 MIN: CPT | Performed by: OBSTETRICS & GYNECOLOGY

## 2019-07-15 RX ORDER — ESTRADIOL 0.1 MG/D
1 FILM, EXTENDED RELEASE TRANSDERMAL 2 TIMES WEEKLY
Qty: 24 PATCH | Refills: 4 | Status: SHIPPED | OUTPATIENT
Start: 2019-07-15

## 2019-07-15 RX ORDER — HYDROCHLOROTHIAZIDE 25 MG/1
25 TABLET ORAL DAILY
Qty: 90 TABLET | Refills: 0 | Status: SHIPPED | OUTPATIENT
Start: 2019-07-15 | End: 2019-10-22

## 2019-07-15 RX ORDER — BUPROPION HYDROCHLORIDE 150 MG/1
150 TABLET ORAL EVERY MORNING
Qty: 30 TABLET | Refills: 12 | Status: SHIPPED | OUTPATIENT
Start: 2019-07-15 | End: 2020-07-14

## 2019-07-15 RX ORDER — LISINOPRIL 40 MG/1
40 TABLET ORAL DAILY
Qty: 90 TABLET | Refills: 0 | Status: SHIPPED | OUTPATIENT
Start: 2019-07-15

## 2019-07-15 RX ORDER — MELOXICAM 15 MG/1
15 TABLET ORAL DAILY
Qty: 90 TABLET | Refills: 0 | Status: SHIPPED | OUTPATIENT
Start: 2019-07-15 | End: 2019-11-25 | Stop reason: SDUPTHER

## 2019-07-15 NOTE — PROGRESS NOTES
Saniya Lundberg is a 58 y.o. y/o female.     Chief Complaint: Patient here to discuss medications and get refills.    HPI:   58 y.o. No obstetric history on file..  No LMP recorded. Patient has had a hysterectomy.  Patient presents to discuss medications that she is on and to discuss refills of these medications.  Patient had been seeing a different provider at this location previously and is in need of multiple medication refills.  Patient is currently on estradiol patches 0.1 mg per 24 hours and also on a Depo estrogen shot patient states that she still does have some hot flush symptoms however this combination seems to be working the best for her so far.  Patient desires to continue this medication at this time.  She was also placed on Wellbutrin or climacteric symptoms and for overall emotional wellness.  Patient states that this medication is not bothering her since she was switched to the XL from the SR would like to continue taking this medication as well.  Patient was started on Lunesta for sleep, I explained to the patient that this was not a medication that I prescribed routinely and that I felt that this would be better managed by her primary care provider.  She is also taking Flonase and Allegra which can both be bought over the counter.  And that is how patient is currently purchasing these medications.  Patient is not currently taking Robinul, will not refill at this time.  She is taking hydrochlorothiazide and lisinopril for elevated blood pressure, this was also being managed by a provider in this clinic.  I explained to the patient that I do not generally manage these medications as a primary doctor and recommended that she find a primary care physician to better manage and control her blood pressure medications.  Patient felt comfortable with this plan.  Patient is also taking metformin for glucose intolerance.  I also recommended that she find a primary care physician to manage this  medication as a am concerned that it may eventually proceed to diabetes.  I did refill hydrochlorothiazide lisinopril and metformin for an additional 3 months while she finds a new primary care physician.  Patient is taking Myrbetriq for urge incontinence, states that this together with pelvic floor physical therapy has helped her incontinence and desires to continue this medication we will place a 1 year refill on this medication as I feel comfortable prescribing this.  All of patient's questions were answered during this visit, we will plan to have patient return in 3 months to evaluate how she is feeling and to see how she is doing with finding a primary care provider.     Review of Systems   Constitutional: Negative for chills, fatigue and fever.   HENT: Negative for sore throat.    Eyes: Negative for visual disturbance.   Respiratory: Negative for cough, shortness of breath and wheezing.    Cardiovascular: Negative for chest pain, palpitations and leg swelling.   Gastrointestinal: Negative for abdominal pain, diarrhea, nausea and vomiting.   Genitourinary: Positive for urgency. Negative for dysuria, flank pain, frequency, vaginal bleeding, vaginal discharge and vaginal pain.   Neurological: Negative for syncope, light-headedness and headaches.   Psychiatric/Behavioral: Negative for dysphoric mood and suicidal ideas. The patient is not nervous/anxious.         The following portions of the patient's history were reviewed and updated as appropriate: allergies, current medications, past family history, past medical history, past social history, past surgical history and problem list.    No Known Allergies     Outpatient Medications Prior to Visit   Medication Sig Dispense Refill   • aspirin 81 MG EC tablet Take 81 mg by mouth Daily.     • Cholecalciferol (VITAMIN D3) 5000 UNITS tablet Take 1 tablet by mouth Daily.     • estradiol cypionate (DEPO-ESTRADIOL) 5 MG/ML injection Inject  into the appropriate muscle as  directed by prescriber Every 28 (Twenty-Eight) Days.     • eszopiclone (LUNESTA) 3 MG tablet Take 1 tablet by mouth Every Night. Take immediately before bedtime 30 tablet 5   • fexofenadine (ALLEGRA) 180 MG tablet Take 1 tablet by mouth Daily. 90 tablet 3   • pseudoephedrine (SUDAFED) 30 MG tablet Take 30 mg by mouth Every 4 (Four) Hours As Needed for congestion.     • buPROPion XL (WELLBUTRIN XL) 150 MG 24 hr tablet Take 1 tablet by mouth Every Morning. 30 tablet 12   • estradiol (MINIVELLE, VIVELLE-DOT) 0.1 MG/24HR patch Place 1 patch on the skin as directed by provider 2 (Two) Times a Week. 32 patch 4   • hydrochlorothiazide (HYDRODIURIL) 25 MG tablet TAKE 1 TABLET DAILY 90 tablet 3   • lisinopril (PRINIVIL,ZESTRIL) 40 MG tablet TAKE 1 TABLET DAILY 90 tablet 0   • meloxicam (MOBIC) 15 MG tablet Take 1 tablet by mouth Daily. 90 tablet 0   • metFORMIN (GLUCOPHAGE) 500 MG tablet Take 2 tablets po bid 120 tablet 0   • Mirabegron ER (MYRBETRIQ) 50 MG tablet sustained-release 24 hour 24 hr tablet Take 50 mg by mouth Daily. 90 tablet 4   • Cyanocobalamin (B12 LIQUID HEALTH BOOSTER PO) Take  by mouth.     • fluticasone (FLONASE) 50 MCG/ACT nasal spray 2 sprays into the nostril(s) as directed by provider Daily. 16 g 12   • glycopyrrolate (ROBINUL) 1 MG tablet Take 1 tablet by mouth 2 (Two) Times a Day. 60 tablet 12     No facility-administered medications prior to visit.         The patient has a family history of   No family history on file.     Past Medical History:   Diagnosis Date   • Cancer (CMS/HCC)     Basal cell carcinoma face/neck   • Dysthymia 10/17/2018   • Essential hypertension 4/25/2017   • Insomnia due to medical condition 8/28/2017   • Insulin resistance 4/25/2017   • Menopausal and female climacteric states 4/25/2017   • Obesity (BMI 30.0-34.9) 4/25/2017   • Overactive bladder 8/28/2017   • Overweight (BMI 25.0-29.9) 6/26/2017   • Spondylolisthesis at L5-S1 level         OB History     No data available  "          Social History     Socioeconomic History   • Marital status: Single     Spouse name: Not on file   • Number of children: Not on file   • Years of education: Not on file   • Highest education level: Not on file   Tobacco Use   • Smoking status: Never Smoker   • Smokeless tobacco: Never Used   Substance and Sexual Activity   • Alcohol use: No   • Drug use: No   • Sexual activity: Not Currently     Birth control/protection: Surgical        Past Surgical History:   Procedure Laterality Date   • EYE SURGERY      Lasik bilaterally   • HYSTERECTOMY  2007    Complete   • TONSILLECTOMY     • WRIST SURGERY      cyst removal L         Patient Active Problem List   Diagnosis   • Menopausal and female climacteric states   • Essential hypertension   • Insulin resistance   • Overweight (BMI 25.0-29.9)   • Overactive bladder   • Insomnia due to medical condition   • Dysthymia        Documented Vitals    07/15/19 0838   BP: 106/65   Weight: 83 kg (183 lb)   Height: 167.6 cm (66\")   PainSc: 0-No pain        Body mass index is 29.54 kg/m².    Physical Exam   Constitutional: She appears well-developed and well-nourished. No distress.   HENT:   Head: Normocephalic.   Skin: She is not diaphoretic.   Psychiatric: She has a normal mood and affect. Her behavior is normal. Judgment and thought content normal.     Laboratory Data:   Lab Results - Last 18 Months   Lab Units 12/11/18  0818   GLUCOSE mg/dL 88   BUN mg/dL 16   CREATININE mg/dL 0.65   SODIUM mmol/L 135*   POTASSIUM mmol/L 3.8   CHLORIDE mmol/L 100   CO2 mmol/L 27.0   CALCIUM mg/dL 8.9   TOTAL PROTEIN g/dL 6.2*   ALBUMIN g/dL 3.70   ALT (SGPT) U/L 26   AST (SGOT) U/L 14   ALK PHOS U/L 61   BILIRUBIN mg/dL 0.4   EGFR IF NONAFRICN AM mL/min/1.73 94   GLOBULIN gm/dL 2.5   A/G RATIO g/dL 1.5   BUN / CREAT RATIO  24.6   ANION GAP mmol/L 8.0     Lab Results - Last 18 Months   Lab Units 12/11/18  0818   WBC 10*3/mm3 2.17*   RBC 10*6/mm3 4.76   HEMOGLOBIN g/dL 14.3   HEMATOCRIT % " 43.7   MCV fL 91.8   MCH pg 30.0   MCHC g/dL 32.7   RDW % 13.7   RDW-SD fl 45.9   MPV fL 9.6   PLATELETS 10*3/mm3 374     No results for input(s): HCGQUAL in the last 94190 hours.    Assessment   Patient with symptomatic menopausal symptoms currently moderately controlled with estrogen injections and estrogen patch.  Plan to continue both of these medications at this time.  Patient also with overactive bladder on Myrbetriq and doing pelvic floor physical therapy which seems to be improving incontinence symptoms.  Patient on Lunesta for insomnia Wellbutrin for dysthymia and metformin for insulin resistance we will continue these medications however recommended that patient find a primary care provider to help manage her non-OB/GYN symptoms.     Diagnosis Plan   1. Symptomatic menopausal or female climacteric states  estradiol cypionate (DEPO-ESTRADIOL) 5 MG/ML injection 5 mg   2. Overactive bladder     3. Insomnia due to medical condition     4. Dysthymia     5. Insulin resistance           Plan  Plan to refill patient's medications for 1 year for the OB/GYN take medications in 3 months for her other medications till she can find a primary care provider.  Patient to follow-up in 3 months to see if she needs anything and how treatment is going at this time.  Patient to return sooner as needed.    I spent greater than 50% of this 30-minute appointment discussing medications and treatment options with this patient.       New Medications Ordered This Visit   Medications   • estradiol cypionate (DEPO-ESTRADIOL) 5 MG/ML injection 5 mg   • buPROPion XL (WELLBUTRIN XL) 150 MG 24 hr tablet     Sig: Take 1 tablet by mouth Every Morning.     Dispense:  30 tablet     Refill:  12   • estradiol (MINIVELLE, VIVELLE-DOT) 0.1 MG/24HR patch     Sig: Place 1 patch on the skin as directed by provider 2 (Two) Times a Week.     Dispense:  24 patch     Refill:  4   • hydrochlorothiazide (HYDRODIURIL) 25 MG tablet     Sig: Take 1 tablet by  mouth Daily.     Dispense:  90 tablet     Refill:  0   • lisinopril (PRINIVIL,ZESTRIL) 40 MG tablet     Sig: Take 1 tablet by mouth Daily.     Dispense:  90 tablet     Refill:  0     Needs to see Family Practice provider for any additional refills.   • meloxicam (MOBIC) 15 MG tablet     Sig: Take 1 tablet by mouth Daily.     Dispense:  90 tablet     Refill:  0   • metFORMIN (GLUCOPHAGE) 500 MG tablet     Sig: Take 2 tablets po bid     Dispense:  120 tablet     Refill:  3   • Mirabegron ER (MYRBETRIQ) 50 MG tablet sustained-release 24 hour 24 hr tablet     Sig: Take 50 mg by mouth Daily.     Dispense:  90 tablet     Refill:  4             This document has been electronically signed by Presley Cheung MD on July 15, 2019 10:11 AM

## 2019-10-22 ENCOUNTER — OFFICE VISIT (OUTPATIENT)
Dept: OBSTETRICS AND GYNECOLOGY | Facility: CLINIC | Age: 59
End: 2019-10-22

## 2019-10-22 VITALS
DIASTOLIC BLOOD PRESSURE: 81 MMHG | WEIGHT: 181 LBS | HEIGHT: 66 IN | SYSTOLIC BLOOD PRESSURE: 142 MMHG | BODY MASS INDEX: 29.09 KG/M2

## 2019-10-22 DIAGNOSIS — N95.1 MENOPAUSAL SYNDROME: Chronic | ICD-10-CM

## 2019-10-22 DIAGNOSIS — F34.1 DYSTHYMIA: Primary | Chronic | ICD-10-CM

## 2019-10-22 DIAGNOSIS — N32.81 OVERACTIVE BLADDER: Chronic | ICD-10-CM

## 2019-10-22 PROCEDURE — 99213 OFFICE O/P EST LOW 20 MIN: CPT | Performed by: OBSTETRICS & GYNECOLOGY

## 2019-10-22 RX ORDER — ESZOPICLONE 3 MG/1
3 TABLET, FILM COATED ORAL NIGHTLY
Qty: 30 TABLET | Refills: 0 | Status: SHIPPED | OUTPATIENT
Start: 2019-10-22

## 2019-10-22 RX ORDER — LOSARTAN POTASSIUM AND HYDROCHLOROTHIAZIDE 25; 100 MG/1; MG/1
1 TABLET ORAL DAILY
COMMUNITY

## 2019-10-22 NOTE — PROGRESS NOTES
Saniya Lundberg is a 59 y.o. y/o female.     Chief Complaint: Medication refill follow-up    HPI:   59 y.o. No obstetric history on file..  No LMP recorded. Patient has had a hysterectomy..  Patient is overall without complaints today.  States that her medications are still working well for her.  Does have some hot flushing symptoms when she gets nervous but none at night and tolerating well.  Patient has found a primary care doctor but unable to see them until next week.  Needs a refill on Lunesta for an additional month.  No other concerns at this time.  Patient to follow-up in approximately 6 to 8 months, sooner as needed.     Review of Systems   Constitutional: Negative for chills, fatigue and fever.   HENT: Negative for sore throat.    Eyes: Negative for visual disturbance.   Respiratory: Negative for cough, shortness of breath and wheezing.    Cardiovascular: Negative for chest pain, palpitations and leg swelling.   Gastrointestinal: Negative for abdominal pain, diarrhea, nausea and vomiting.   Genitourinary: Negative for dysuria, flank pain, frequency, vaginal bleeding, vaginal discharge and vaginal pain.   Neurological: Negative for syncope, light-headedness and headaches.   Psychiatric/Behavioral: Negative for dysphoric mood and suicidal ideas. The patient is not nervous/anxious.         The following portions of the patient's history were reviewed and updated as appropriate: allergies, current medications, past family history, past medical history, past social history, past surgical history and problem list.    No Known Allergies     Prior to Admission medications    Medication Sig Start Date End Date Taking? Authorizing Provider   aspirin 81 MG EC tablet Take 81 mg by mouth Daily.   Yes ProviderBrenda MD   buPROPion XL (WELLBUTRIN XL) 150 MG 24 hr tablet Take 1 tablet by mouth Every Morning. 7/15/19 7/14/20 Yes Presley Cheung DO   Cholecalciferol (VITAMIN D3) 5000 UNITS tablet Take 1  tablet by mouth Daily.   Yes ProviderBrenda MD   estradiol (MINIVELLE, VIVELLE-DOT) 0.1 MG/24HR patch Place 1 patch on the skin as directed by provider 2 (Two) Times a Week. 7/15/19  Yes Presley Cheung DO   estradiol cypionate (DEPO-ESTRADIOL) 5 MG/ML injection Inject  into the appropriate muscle as directed by prescriber Every 28 (Twenty-Eight) Days.   Yes Brenda Cotter MD   eszopiclone (LUNESTA) 3 MG tablet Take 1 tablet by mouth Every Night. Take immediately before bedtime 8/6/18  Yes Abdifatah Bell MD   fexofenadine (ALLEGRA) 180 MG tablet Take 1 tablet by mouth Daily. 3/14/17  Yes Abdifatah Bell MD   lisinopril (PRINIVIL,ZESTRIL) 40 MG tablet Take 1 tablet by mouth Daily. 7/15/19  Yes Presley Cheung DO   losartan-hydrochlorothiazide (HYZAAR) 100-25 MG per tablet Take 1 tablet by mouth Daily.   Yes Brenda Cotter MD   meloxicam (MOBIC) 15 MG tablet Take 1 tablet by mouth Daily. 7/15/19  Yes Presley Cheung DO   metFORMIN (GLUCOPHAGE) 500 MG tablet Take 2 tablets po bid 7/23/19  Yes Presley Cheung DO   Mirabegron ER (MYRBETRIQ) 50 MG tablet sustained-release 24 hour 24 hr tablet Take 50 mg by mouth Daily. 7/15/19 7/14/20 Yes Presley Cheung DO   pseudoephedrine (SUDAFED) 30 MG tablet Take 30 mg by mouth Every 4 (Four) Hours As Needed for congestion.   Yes ProviderBrenda MD   hydrochlorothiazide (HYDRODIURIL) 25 MG tablet Take 1 tablet by mouth Daily. 7/15/19 10/22/19  Presley Cheung DO        The patient has a family history of   No family history on file.     Past Medical History:   Diagnosis Date   • Cancer (CMS/HCC)     Basal cell carcinoma face/neck   • Dysthymia 10/17/2018   • Essential hypertension 4/25/2017   • Insomnia due to medical condition 8/28/2017   • Insulin resistance 4/25/2017   • Menopausal and female climacteric states 4/25/2017   • Obesity (BMI 30.0-34.9) 4/25/2017   • Overactive bladder 8/28/2017   • Overweight  "(BMI 25.0-29.9) 6/26/2017   • Spondylolisthesis at L5-S1 level         OB History     No data available           Social History     Socioeconomic History   • Marital status: Single     Spouse name: Not on file   • Number of children: Not on file   • Years of education: Not on file   • Highest education level: Not on file   Tobacco Use   • Smoking status: Never Smoker   • Smokeless tobacco: Never Used   Substance and Sexual Activity   • Alcohol use: No   • Drug use: No   • Sexual activity: Not Currently     Birth control/protection: Surgical        Past Surgical History:   Procedure Laterality Date   • EYE SURGERY      Lasik bilaterally   • HYSTERECTOMY  2007    Complete   • TONSILLECTOMY     • WRIST SURGERY      cyst removal L         Patient Active Problem List   Diagnosis   • Menopausal and female climacteric states   • Essential hypertension   • Insulin resistance   • Overweight (BMI 25.0-29.9)   • Overactive bladder   • Insomnia due to medical condition   • Dysthymia        Documented Vitals    10/22/19 0854   BP: 142/81   Weight: 82.1 kg (181 lb)   Height: 167.6 cm (66\")   PainSc: 0-No pain        Body mass index is 29.21 kg/m².    Physical Exam   Constitutional: She is oriented to person, place, and time. She appears well-developed and well-nourished. No distress.   HENT:   Head: Normocephalic.   Musculoskeletal: Normal range of motion.   Neurological: She is alert and oriented to person, place, and time.   Skin: Skin is warm and dry. She is not diaphoretic.   Psychiatric: She has a normal mood and affect. Her behavior is normal. Judgment and thought content normal.   Vitals reviewed.      Laboratory Data:   Lab Results - Last 18 Months   Lab Units 12/11/18  0818   GLUCOSE mg/dL 88   BUN mg/dL 16   CREATININE mg/dL 0.65   SODIUM mmol/L 135*   POTASSIUM mmol/L 3.8   CHLORIDE mmol/L 100   CO2 mmol/L 27.0   CALCIUM mg/dL 8.9   TOTAL PROTEIN g/dL 6.2*   ALBUMIN g/dL 3.70   ALT (SGPT) U/L 26   AST (SGOT) U/L 14 "   ALK PHOS U/L 61   BILIRUBIN mg/dL 0.4   EGFR IF NONAFRICN AM mL/min/1.73 94   GLOBULIN gm/dL 2.5   A/G RATIO g/dL 1.5   BUN / CREAT RATIO  24.6   ANION GAP mmol/L 8.0     Lab Results - Last 18 Months   Lab Units 12/11/18  0818   WBC 10*3/mm3 2.17*   RBC 10*6/mm3 4.76   HEMOGLOBIN g/dL 14.3   HEMATOCRIT % 43.7   MCV fL 91.8   MCH pg 30.0   MCHC g/dL 32.7   RDW % 13.7   RDW-SD fl 45.9   MPV fL 9.6   PLATELETS 10*3/mm3 374     No results for input(s): HCGQUAL in the last 01109 hours.    Assessment   Doing well at this time.  Medications seem to be working well for the patient.  Plan to have her refill return to see me in 6 to 8 months for GYN medication refills.  Patient to return sooner as needed.  Refill Lunesta today remainder of medications to be managed by primary care physician.     Diagnosis Plan   1. Dysthymia     2. Menopausal and female climacteric states     3. Overactive bladder           Plan         New Medications Ordered This Visit   Medications   • eszopiclone (LUNESTA) 3 MG tablet     Sig: Take 1 tablet by mouth Every Night. Take immediately before bedtime     Dispense:  30 tablet     Refill:  0             This document has been electronically signed by Presley Cheung DO on October 22, 2019 9:13 AM

## 2019-11-26 RX ORDER — MELOXICAM 15 MG/1
TABLET ORAL
Qty: 90 TABLET | Refills: 2 | Status: SHIPPED | OUTPATIENT
Start: 2019-11-26